# Patient Record
Sex: FEMALE | Race: WHITE
[De-identification: names, ages, dates, MRNs, and addresses within clinical notes are randomized per-mention and may not be internally consistent; named-entity substitution may affect disease eponyms.]

---

## 2019-09-23 ENCOUNTER — HOSPITAL ENCOUNTER (OUTPATIENT)
Dept: HOSPITAL 75 - RAD FS | Age: 84
End: 2019-09-23
Attending: NURSE PRACTITIONER
Payer: MEDICARE

## 2019-09-23 DIAGNOSIS — M17.12: Primary | ICD-10-CM

## 2019-09-23 PROCEDURE — 73562 X-RAY EXAM OF KNEE 3: CPT

## 2019-09-23 NOTE — DIAGNOSTIC IMAGING REPORT
INDICATION: Chronic left knee pain.



TIME OF EXAM: 1:48 p.m.



FINDINGS: Three views of the left knee were obtained. There is

significant lateral compartmental degenerative change with joint

space narrowing and marginal spurring. Milder medial and

patellofemoral compartmental degenerative changes noted. No

fractures are seen. There is no joint effusion.



IMPRESSION: Degenerative changes. No acute bony abnormality is

detected.



Dictated by: 



  Dictated on workstation # [id number]

## 2020-11-25 ENCOUNTER — HOSPITAL ENCOUNTER (OUTPATIENT)
Dept: HOSPITAL 75 - ER FS | Age: 85
Setting detail: OBSERVATION
LOS: 2 days | End: 2020-11-27
Attending: FAMILY MEDICINE | Admitting: INTERNAL MEDICINE
Payer: MEDICARE

## 2020-11-25 VITALS — SYSTOLIC BLOOD PRESSURE: 161 MMHG | DIASTOLIC BLOOD PRESSURE: 84 MMHG

## 2020-11-25 VITALS — SYSTOLIC BLOOD PRESSURE: 138 MMHG | DIASTOLIC BLOOD PRESSURE: 84 MMHG

## 2020-11-25 VITALS — SYSTOLIC BLOOD PRESSURE: 158 MMHG | DIASTOLIC BLOOD PRESSURE: 82 MMHG

## 2020-11-25 VITALS — SYSTOLIC BLOOD PRESSURE: 134 MMHG | DIASTOLIC BLOOD PRESSURE: 71 MMHG

## 2020-11-25 VITALS — HEIGHT: 63.98 IN | BODY MASS INDEX: 24.73 KG/M2 | WEIGHT: 144.84 LBS

## 2020-11-25 DIAGNOSIS — Z88.5: ICD-10-CM

## 2020-11-25 DIAGNOSIS — Z79.82: ICD-10-CM

## 2020-11-25 DIAGNOSIS — F03.90: ICD-10-CM

## 2020-11-25 DIAGNOSIS — R41.82: ICD-10-CM

## 2020-11-25 DIAGNOSIS — R09.02: Primary | ICD-10-CM

## 2020-11-25 DIAGNOSIS — J18.9: ICD-10-CM

## 2020-11-25 DIAGNOSIS — Z20.828: ICD-10-CM

## 2020-11-25 DIAGNOSIS — N39.0: ICD-10-CM

## 2020-11-25 DIAGNOSIS — Z79.899: ICD-10-CM

## 2020-11-25 LAB
ALBUMIN SERPL-MCNC: 4 GM/DL (ref 3.2–4.5)
ALP SERPL-CCNC: 56 U/L (ref 40–136)
ALT SERPL-CCNC: 18 U/L (ref 0–55)
APTT PPP: YELLOW S
BACTERIA #/AREA URNS HPF: (no result) /HPF
BASOPHILS # BLD AUTO: 0.1 10^3/UL (ref 0–0.1)
BASOPHILS NFR BLD AUTO: 1 % (ref 0–10)
BILIRUB SERPL-MCNC: 0.3 MG/DL (ref 0.1–1)
BILIRUB UR QL STRIP: NEGATIVE
BUN/CREAT SERPL: 24
CALCIUM SERPL-MCNC: 9.6 MG/DL (ref 8.5–10.1)
CHLORIDE SERPL-SCNC: 99 MMOL/L (ref 98–107)
CO2 SERPL-SCNC: 25 MMOL/L (ref 21–32)
CREAT SERPL-MCNC: 0.67 MG/DL (ref 0.6–1.3)
EOSINOPHIL # BLD AUTO: 0.3 10^3/UL (ref 0–0.3)
EOSINOPHIL NFR BLD AUTO: 3 % (ref 0–10)
FIBRINOGEN PPP-MCNC: (no result) MG/DL
GFR SERPLBLD BASED ON 1.73 SQ M-ARVRAT: > 60 ML/MIN
GLUCOSE SERPL-MCNC: 93 MG/DL (ref 70–105)
GLUCOSE UR STRIP-MCNC: NEGATIVE MG/DL
HCT VFR BLD CALC: 43 % (ref 35–52)
HGB BLD-MCNC: 14.1 G/DL (ref 11.5–16)
KETONES UR QL STRIP: NEGATIVE
LEUKOCYTE ESTERASE UR QL STRIP: (no result)
LYMPHOCYTES # BLD AUTO: 20.6 X 10^3 (ref 1–4)
LYMPHOCYTES NFR BLD AUTO: 22 % (ref 12–44)
MANUAL DIFFERENTIAL PERFORMED BLD QL: NO
MCH RBC QN AUTO: 35 PG (ref 25–34)
MCHC RBC AUTO-ENTMCNC: 33 G/DL (ref 32–36)
MCV RBC AUTO: 107 FL (ref 80–99)
MONOCYTES # BLD AUTO: 0.9 X 10^3 (ref 0–1)
MONOCYTES NFR BLD AUTO: 10 % (ref 0–12)
NEUTROPHILS # BLD AUTO: 5.9 X 10^3 (ref 1.8–7.8)
NEUTROPHILS NFR BLD AUTO: 63 % (ref 42–75)
NITRITE UR QL STRIP: POSITIVE
PH UR STRIP: 7 [PH] (ref 5–9)
PLATELET # BLD: 316 10^3/UL (ref 130–400)
PMV BLD AUTO: 9 FL (ref 7.4–10.4)
POTASSIUM SERPL-SCNC: 4.6 MMOL/L (ref 3.6–5)
PROT SERPL-MCNC: 7 GM/DL (ref 6.4–8.2)
PROT UR QL STRIP: (no result)
RBC #/AREA URNS HPF: (no result) /HPF
SODIUM SERPL-SCNC: 134 MMOL/L (ref 135–145)
SP GR UR STRIP: 1.02 (ref 1.02–1.02)
SQUAMOUS #/AREA URNS HPF: (no result) /HPF
WBC # BLD AUTO: 9.4 10^3/UL (ref 4.3–11)
WBC #/AREA URNS HPF: (no result) /HPF

## 2020-11-25 PROCEDURE — 81000 URINALYSIS NONAUTO W/SCOPE: CPT

## 2020-11-25 PROCEDURE — 85025 COMPLETE CBC W/AUTO DIFF WBC: CPT

## 2020-11-25 PROCEDURE — 71046 X-RAY EXAM CHEST 2 VIEWS: CPT

## 2020-11-25 PROCEDURE — 97535 SELF CARE MNGMENT TRAINING: CPT

## 2020-11-25 PROCEDURE — 87088 URINE BACTERIA CULTURE: CPT

## 2020-11-25 PROCEDURE — 87077 CULTURE AEROBIC IDENTIFY: CPT

## 2020-11-25 PROCEDURE — 99284 EMERGENCY DEPT VISIT MOD MDM: CPT

## 2020-11-25 PROCEDURE — 36415 COLL VENOUS BLD VENIPUNCTURE: CPT

## 2020-11-25 PROCEDURE — 87186 SC STD MICRODIL/AGAR DIL: CPT

## 2020-11-25 PROCEDURE — 51702 INSERT TEMP BLADDER CATH: CPT

## 2020-11-25 PROCEDURE — 80053 COMPREHEN METABOLIC PANEL: CPT

## 2020-11-25 PROCEDURE — 87040 BLOOD CULTURE FOR BACTERIA: CPT

## 2020-11-25 PROCEDURE — 97162 PT EVAL MOD COMPLEX 30 MIN: CPT

## 2020-11-25 PROCEDURE — 87635 SARS-COV-2 COVID-19 AMP PRB: CPT

## 2020-11-25 PROCEDURE — 80048 BASIC METABOLIC PNL TOTAL CA: CPT

## 2020-11-25 PROCEDURE — 71045 X-RAY EXAM CHEST 1 VIEW: CPT

## 2020-11-25 PROCEDURE — 97110 THERAPEUTIC EXERCISES: CPT

## 2020-11-25 PROCEDURE — 97116 GAIT TRAINING THERAPY: CPT

## 2020-11-25 PROCEDURE — 80184 ASSAY OF PHENOBARBITAL: CPT

## 2020-11-25 PROCEDURE — 97530 THERAPEUTIC ACTIVITIES: CPT

## 2020-11-25 PROCEDURE — 86141 C-REACTIVE PROTEIN HS: CPT

## 2020-11-25 PROCEDURE — 84145 PROCALCITONIN (PCT): CPT

## 2020-11-25 PROCEDURE — 97166 OT EVAL MOD COMPLEX 45 MIN: CPT

## 2020-11-25 RX ADMIN — TRAZODONE HYDROCHLORIDE SCH MG: 100 TABLET ORAL at 20:14

## 2020-11-25 RX ADMIN — ALPRAZOLAM SCH MG: 0.25 TABLET ORAL at 20:15

## 2020-11-25 RX ADMIN — PHENOBARBITAL SCH MG: 64.8 TABLET ORAL at 20:17

## 2020-11-25 RX ADMIN — MELATONIN 3 MG ORAL TABLET PRN MG: 3 TABLET ORAL at 20:14

## 2020-11-25 RX ADMIN — DOCUSATE SODIUM SCH MG: 100 CAPSULE ORAL at 20:14

## 2020-11-25 RX ADMIN — SODIUM CHLORIDE SCH MLS/HR: 900 INJECTION, SOLUTION INTRAVENOUS at 15:57

## 2020-11-25 RX ADMIN — ENOXAPARIN SODIUM SCH MG: 100 INJECTION SUBCUTANEOUS at 15:58

## 2020-11-25 RX ADMIN — SENNOSIDES SCH MG: 8.6 TABLET, FILM COATED ORAL at 20:14

## 2020-11-25 NOTE — OCCUPATIONAL THERAPY EVAL
OT Evaluation-General/PLF


Medical Diagnosis


Admission Date


Nov 25, 2020 at 12:50


Medical Diagnosis:  Pneumonia, hypoxia


Onset Date:  Nov 25, 2020





Therapy Diagnosis


Therapy Diagnosis:  decreased ADL status, weakness





Precautions


Precautions/Isolations:  Contact Isolation, Droplet Isolation, Fall Prevention





Referral


Physician:  Antwan


Referral Reason:  Evaluation/Treatment





Medical History


Additional Medical History


seizures


Current History


EMS for initial call stating concern for stroke, AMS


Reviewed History:  Yes





Social History


Home:  Nursing Home





ADL-Prior Level of Function


SCALE: Activities may be completed with or without assistive devices.





6-Indepedent-patient completes the activity by him/herself with no assistance 

from a helper.


5-Set-up or Clean-up Assistance-helper sets up or cleans up; patient completes 

activity. [place] assists only prior to or  


    following the activity.


4-Supervision or Touching Assistance-helper provides verbal cues and/or 

touching/steadying and/or contact guard assistance as patient completes 

activity. Assistance may be provided   


    throughout the activity or intermittently.


3-Partial/Moderate Assistance-helper does LESS THAN HALF the effort. [place] 

lifts, holds or supports trunk or limbs, but provides less than half the effort.


2-Substantial/Maximal Assistance-helper does MORE THAN HALF the effort. [place] 

lifts or holds trunk or limbs and provides more than half the effort.


5-Ngvlnbfke-grirej does ALL the effort. Patient does none of the effort to 

complete the activity. Or, the assistance of 2 or more helpers is required for 

the patient to complete the  


    activity.


If activity was not attempted, code reason:


7-Patient Refused.


9-Not Applicable-not attempted and the patient did not perform the activity 

before the current illness, exacerbation or injury.


10-Not Attempted due to Environmental Limitations-(lack of equipment, weather 

restraints, etc.).


88-Not Attempted due to Medical Conditions or Safety Concerns.


ADL PLOF Comments


Pt resided at a NH where she primarily performed functional mobility using a 

w/c. Pt indicates she is able to self propel w/c. Pt required assistance with 

ADLS


Self Care:  Needed Some Help


Functional Cognition:  Independent


DME/Equipment Comments


w/c





OT Current Status


Subjective


Pt laying in bed, agreeable to OT evaluation. Pt reports pain but unable to 

rate.





Mental Status/Objective


Patient Orientation:  Person, Confused


Attachments:  Platt Catheter





Current


Glasses/Contacts:  Yes


Dentures/Partials:  Yes


Upper Extremity ROM


RUE shoulder flexion to approx 130 degrees, LUE shoulder flexion to approx 80 

degrees (pt grimaced with movement)


Upper Extremity Sensation


WFL


Upper Extremity Strength


grossly 3/5





ADL-Treatment


Eating (QC):  5 (Based on clincial judgement, pt would require set up assistance

for meals.)


On/Off Footwear (QC):  1 (Based on clincial judgemnet and pain in LEs, pt would 

require total assistance with task.)





Other Treatments


Pt laying in bed, transferred supine to sit EOB. Pt transferred via SPT from bed

to recliner, max A. Once at recliner, pt participated in UE screen. Pt asking 

for her dentures, pt's nurse indicates she has been asking for them constantly 

and nursing staff has told pt they are at nursing home. Post OT tx, pt seated in

recliner, call light in reach and all needs met. Pt educated to use call light 

when she needs to get up.





Education


OT Patient Education:  Correct positioning, Modified ADL techniques, Progress 

toward Goal/Update tx plan, Purpose of tx/functional activities


Teaching Recipient:  Patient


Teaching Methods:  Discussion


Response to Teaching:  Verbalize Understanding





OT Long Term Goals


Long Term Goals


Time Frame:  Dec 4, 2020


Eating (QC):  5


Oral Hygiene (QC):  5


Toileting Hygiene (QC):  3


Shower/Bathe Self (QC):  3


Upper Body Dressing (QC):  4


Lower Body Dressing (QC):  3


On/Off Footwear (QC):  2


Additional Goals:  1-Demonstrate ADL Tasks, 2-Verbalize Understanding, 3-

ImproveStrength/Starla


1=Demonstrate adherence to instructed precautions during ADL tasks.


2=Patient will verbalize/demonstrate understanding of assistive devic

es/modifications for ADL.


3=Patient will improve strength/tolerance for activity to enable patient to 

perform ADL's.





OT Education/Plan


Problem List/Assessment


Assessment:  Decreased Activ Tolerance, Decreased UE Strength, Impaired Bed 

Mobility, Impaired Funct Balance, Impaired I ADL's, Impaired Self-Care Skills, 

Restricted Funct UE ROM





Discharge Recommendations


Plan/Recommendations:  Continue POC


Therapy Discharge Recommendati:  Other, See Comments (Nursing Home)





Treatment Plan/Plan of Care


Patient would benefit from OT for education, treatment and training to promote 

independence in ADL's, mobility, safety and/or upper extremity function for 

ADL's.


Plan of Care:  ADL Retraining, Functional Mobility, UE Funct Exercise/Act


Treatment Duration:  Dec 4, 2020


Frequency:  5 times per week


Agreement:  Yes


Rehab Potential:  Guarded





Time/GCodes


Start Time:  15:30


Stop Time:  15:41


Total Time Billed (hr/min):  11


Billed Treatment Time


1, NAGI FLOWERS OT          Nov 25, 2020 16:13

## 2020-11-25 NOTE — NUR
CALLED [facility] IN [place] TO INQUIRE ABOUT THE POSSIBILITY OF GETTING THE PATIENT'S 
TEETH.  THEY WILL CHECK TO SEE IF THEY CAN FIND SOMEONE WILLING TO BRING THEM HERE.  THEY 
ARE NOT SURE IF IT WILL BE POSSIBLE

## 2020-11-25 NOTE — PHYSICAL THERAPY EVALUATION
PT Evaluation-General


Medical Diagnosis


Admission Date


Nov 25, 2020 at 12:50


Medical Diagnosis:  pneumonia, PUI


Onset Date:  Nov 25, 2020





Therapy Diagnosis


Therapy Diagnosis:  impaired mobility, strength, endurance





Precautions


Precautions/Isolations:  Contact Isolation, Droplet Isolation, Fall Prevention





Referral


Physician:  Antwan


Reason for Referral:  Evaluation/Treatment





Medical History


Additional Medical History


seizures


Reviewed History:  Yes





Social History


Home:  Nursing Home





Prior


Prior Level of Function


SCALE: Activities may be completed with or without assistive devices.





6-Indepedent-patient completes the activity by him/herself with no assistance 

from a helper.


5-Set-up or Clean-up Assistance-helper sets up or cleans up; patient completes 

activity. [place] assists only prior to or  


    following the activity.


4-Supervision or Touching Assistance-helper provides verbal cues and/or 

touching/steadying and/or contact guard assistance as patient completes 

activity. Assistance may be provided   


    throughout the activity or intermittently.


3-Partial/Moderate Assistance-helper does LESS THAN HALF the effort. [place] 

lifts, holds or supports trunk or limbs, but provides less than half the effort.


2-Substantial/Maximal Assistance-helper does MORE THAN HALF the effort. [place] 

lifts or holds trunk or limbs and provides more than half the effort.


8-Kswhyzaxj-gjbfhx does ALL the effort. Patient does none of the effort to 

complete the activity. Or, the assistance of 2 or more helpers is required for 

the patient to complete the  


    activity.


If activity was not attempted, code reason:


7-Patient Refused.


9-Not Applicable-not attempted and the patient did not perform the activity befo

re the current illness, exacerbation or injury.


10-Not Attempted due to Environmental Limitations-(lack of equipment, weather re

straints, etc.).


88-Not Attempted due to Medical Conditions or Safety Concerns.


unknown for most of this but patient states that she spends time in a WC and 

needs a lot of help to get into it.





PT Evaluation-Current


Subjective


Patient in bed pre tx, agrees to PT, has no complaints of pain





Pt/Family Goals


none stated





Objective


Patient Orientation:  Person, Place, Eyes Open





ROM/Strength


Strength Lower Extremities


3/5 gross BLE





Sensory


Hearing:  Functional


Sensation Right Lower Extremit:  Intact


Sensation Left Lower Extremity:  Intact





Transfers


Roll Left to Right (QC):  2


Lying to Sitting/Side of Bed(Q:  2


Sit to Stand (QC):  2


Chair/Bed-to-Chair Xfer(QC):  2


Patient max assist to sit on the side of the bed and for stand pivot transfer 

into recliner.  Patient in recliner post tx with nurse call, phone, tray, all 

needs met.  Nurse notified that patient is in recliner, patient states she will 

call nurse if she needs to get up.





Treatment


bed mobility, transfers





Assessment/Needs


Patient has impaired mobility, strength, endurance.  Max assist for transfers.


Rehab Potential:  Poor





PT Long Term Goals


Long Term Goals


PT Long Term Goals Time Frame:  Dec 2, 2020


Roll Left & Right (QC):  3


Sit to Lying (QC):  3


Lying-Sitting on Side/Bed(QC):  3


Sit to Stand (QC):  3


Chair/Bed-to-Chair Xfer(QC):  3





PT Plan


Problem List


Problem List:  Activity Tolerance, Functional Strength, Safety, Balance, 

Transfer, Bed Mobility, ROM





Treatment/Plan


Treatment Plan:  Continue Plan of Care


Treatment Plan:  Bed Mobility, Education, Functional Activity Starla, Functional 

Strength, Safety, Therapeutic Exercise, Transfers


Treatment Duration:  Dec 2, 2020


Frequency:  6 times per week


Estimated Hrs Per Day:  .25 hour per day


Patient and/or Family Agrees t:  Yes





Safety Risks/Education


Patient Education:  Transfer Techniques, Correct Positioning, Safety Issues


Teaching Recipient:  Patient


Teaching Methods:  Demonstration, Discussion


Response to Teaching:  Reinforcement Needed





Discharge Recommendations


Plan


Patient will perform bed mobility and transfer training, balance and endurance 

training, functional strengthening, and education, to improve functional 

mobility and independence at home.


Therapy Discharge Recommendati:  Other, See Comments (NH)





Time/GCodes


Time In:  1530


Time Out:  1541


Total Billed Treatment Time:  11


Total Billed Treatment


1 visit


HARRIET LOPEZ PT                Nov 25, 2020 16:15

## 2020-11-25 NOTE — DIAGNOSTIC IMAGING REPORT
EXAMINATION: Chest 1 view



HISTORY: hypoxia



COMPARISON: None available.



FINDINGS: 



Heart size and pulmonary vasculature are normal. Patchy

interstitial opacities throughout both lungs. More focal airspace

opacities within the right mid lung and left lower lung. No

pleural effusion or pneumothorax. The osseous structures are

intact.



IMPRESSION: 



1. Patchy interstitial and airspace opacities, concerning for a

multifocal pneumonia.



Dictated by: 



  Dictated on workstation # [id number]

## 2020-11-25 NOTE — ED NEUROLOGICAL PROBLEM
General


Chief Complaint:  Altered Mental Status


Stated Complaint:  AMS


Source:  patient, EMS, RN notes reviewed, nursing home records


Exam Limitations:  no limitations





History of Present Illness


Date Seen by Provider:  Nov 25, 2020


Time Seen by Provider:  09:10


Initial Comments


84-year-old female presents via EMS with initial call stating concern for 

stroke.  Patient past medical history significant for seizure disorders and was 

stated to have lost bladder control and have altered mental status with 

questionable one-sided weakness of her face.  On EMS arrival, patient alert and 

oriented without any complaint or obvious neurologic deficit.  Had had slight 

hypoxia with saturations 88 percent started on oxygen, patient reluctant to 

receive care and refused IV.  On arrival she is without complaint and wants to 

go home.





Allergies and Home Medications


Allergies


Coded Allergies:  


     morphine (Verified  Allergy, Unknown, 11/25/20)





Patient Home Medication List


Home Medication List Reviewed:  Yes





Review of Systems


Review of Systems


Constitutional:  no symptoms reported, see HPI; No chills, No fever, No malaise,

No weakness


Respiratory:  No cough, No short of breath


Cardiovascular:  No chest pain, No syncope


Gastrointestinal:  No abdominal pain, No diarrhea, No vomiting


Musculoskeletal:  No back pain, No joint pain


Psychiatric/Neurological:  See HPI; Denies Headache, Denies Numbness, Denies 

Tingling, Denies Unable to Move Lower Ext, Denies Unable to Move Upper Ext, 

Denies Weakness





Past Medical-Social-Family Hx


Past Med/Social Hx:  Reviewed Nursing Past Med/Soc Hx





Physical Exam


Vital Signs





Vital Signs - First Documented








 11/25/20





 09:15


 


Temp 36.4


 


Pulse 70


 


Resp 20


 


B/P (MAP) 147/84 (105)


 


Pulse Ox 90


 


O2 Delivery Room Air





Capillary Refill :


Height, Weight, BMI


Height: '"


Weight: lbs. oz. kg;  BMI


Method:


General Appearance:  WD/WN, no apparent distress


HEENT:  PERRL/EOMI, normal ENT inspection


Neck:  non-tender, full range of motion, supple


Respiratory:  chest non-tender, lungs clear


Cardiovascular:  regular rate, rhythm, no JVD


Gastrointestinal:  non tender, soft


Back:  normal inspection, no CVA tenderness


Extremities:  normal range of motion, non-tender, normal capillary refill


Neurologic/Psychiatric:  CNs II-XII nml as tested, no motor/sensory deficits, 

alert, normal mood/affect


Crainal Nerves:  normal speech; No abnormal speech, No facial asymmetry, No 

facial droop, No facial paresthesias, No facial weakness


Coordination/Gait:  normal finger to nose


Motor/Sensory:  no motor deficit, no sensory deficit, no pronator drift


Skin:  normal color, warm/dry





Progress/Results/Core Measures


Results/Orders


Lab Results





Laboratory Tests








Test


 11/25/20


09:45 11/25/20


09:49 11/25/20


10:04 11/25/20


10:30 Range/Units


 


 


White Blood Count


 9.4 


 


 


 


 4.3-11.0


10^3/uL


 


Red Blood Count


 4.00 L


 


 


 


 4.35-5.85


10^6/uL


 


Hemoglobin 14.1     11.5-16.0  G/DL


 


Hematocrit 43     35-52  %


 


Mean Corpuscular Volume 107 H    80-99  FL


 


Mean Corpuscular Hemoglobin 35 H    25-34  PG


 


Mean Corpuscular Hemoglobin


Concent 33 


 


 


 


 32-36  G/DL





 


Red Cell Distribution Width 12.9     10.0-14.5  %


 


Platelet Count


 316 


 


 


 


 130-400


10^3/uL


 


Mean Platelet Volume 9.0     7.4-10.4  FL


 


Immature Granulocyte % (Auto) 1      %


 


Neutrophils (%) (Auto) 63     42-75  %


 


Lymphocytes (%) (Auto) 22     12-44  %


 


Monocytes (%) (Auto) 10     0-12  %


 


Eosinophils (%) (Auto) 3     0-10  %


 


Basophils (%) (Auto) 1     0-10  %


 


Neutrophils # (Auto) 5.9     1.8-7.8  X 10^3


 


Lymphocytes # (Auto) 20.6 H    1.0-4.0  X 10^3


 


Monocytes # (Auto) 0.9     0.0-1.0  X 10^3


 


Eosinophils # (Auto)


 0.3 


 


 


 


 0.0-0.3


10^3/uL


 


Basophils # (Auto)


 0.1 


 


 


 


 0.0-0.1


10^3/uL


 


Immature Granulocyte # (Auto)


 0.1 


 


 


 


 0.0-0.1


10^3/uL


 


Sodium Level 134 L    135-145  MMOL/L


 


Potassium Level 4.6     3.6-5.0  MMOL/L


 


Chloride Level 99       MMOL/L


 


Carbon Dioxide Level 25     21-32  MMOL/L


 


Anion Gap 10     5-14  MMOL/L


 


Blood Urea Nitrogen 16     7-18  MG/DL


 


Creatinine


 0.67 


 


 


 


 0.60-1.30


MG/DL


 


Estimat Glomerular Filtration


Rate > 60 


 


 


 


  





 


BUN/Creatinine Ratio 24      


 


Glucose Level 93       MG/DL


 


Calcium Level 9.6     8.5-10.1  MG/DL


 


Corrected Calcium 9.6     8.5-10.1  MG/DL


 


Total Bilirubin 0.3     0.1-1.0  MG/DL


 


Aspartate Amino Transf


(AST/SGOT) 20 


 


 


 


 5-34  U/L





 


Alanine Aminotransferase


(ALT/SGPT) 18 


 


 


 


 0-55  U/L





 


Alkaline Phosphatase 56       U/L


 


Total Protein 7.0     6.4-8.2  GM/DL


 


Albumin 4.0     3.2-4.5  GM/DL


 


C-Reactive Protein  0.35    <0.50  MG/DL


 


Urine Color   YELLOW    


 


Urine Clarity   SLT CLOUDY    


 


Urine pH   7.0   5-9  


 


Urine Specific Gravity   1.020   1.016-1.022  


 


Urine Protein   TRACE H  NEGATIVE  


 


Urine Glucose (UA)   NEGATIVE   NEGATIVE  


 


Urine Ketones   NEGATIVE   NEGATIVE  


 


Urine Nitrite   POSITIVE H  NEGATIVE  


 


Urine Bilirubin   NEGATIVE   NEGATIVE  


 


Urine Urobilinogen   1.0   < = 1.0  MG/DL


 


Urine Leukocyte Esterase   1+ H  NEGATIVE  


 


Urine RBC (Auto)   NEGATIVE   NEGATIVE  


 


Urine RBC   NONE    /HPF


 


Urine WBC    H   /HPF


 


Urine Squamous Epithelial


Cells 


 


 0-2 


 


  /HPF





 


Urine Crystals   NONE    /LPF


 


Urine Bacteria   LARGE H   /HPF


 


Urine Casts   NONE    /LPF


 


Urine Mucus   NEGATIVE    /LPF


 


Urine Culture Indicated   YES    








My Orders





Orders - GAYLE CHO DO


Ed Iv/Invasive Line Start (11/25/20 09:15)


Cbc With Automated Diff (11/25/20 09:15)


Comprehensive Metabolic Panel (11/25/20 09:15)


Urinalysis (11/25/20 09:15)


Chest 1 View Ap/Pa Only (11/25/20 09:15)


Phenobarbital (11/25/20 10:13)


Crp Fs (11/25/20 10:17)


Procalcitonin (Pct) (11/25/20 10:17)


Coronavirus Sars-Cov-2 So 2019 (11/25/20 10:17)


Blood Culture (11/25/20 10:18)


Dexamethasone Injection (Decadron Inje (11/25/20 10:30)


Ceftriaxone  For Iv Use (Rocephin  For I (11/25/20 10:30)


Azithromycin Injection (Zithromax Inject (11/25/20 10:30)


Urine Culture (11/25/20 10:04)


Blood Culture (11/25/20 10:45)





Medications Given in ED





Current Medications








 Medications  Dose


 Ordered  Sig/Alen


 Route  Start Time


 Stop Time Status Last Admin


Dose Admin


 


 Azithromycin 500


 mg/Sodium Chloride  250 ml @ 


 250 mls/hr  ONCE  ONCE


 IV  11/25/20 10:30


 11/25/20 11:29 DC 11/25/20 11:09


250 MLS/HR


 


 Ceftriaxone


 Sodium 1000 mg/


 Sterile Water  10 ml @ 


 200 mls/hr  ONCE  ONCE


 IV  11/25/20 10:30


 11/25/20 10:33 DC 11/25/20 11:02


200 MLS/HR


 


 Dexamethasone


 Sodium Phosphate  6 mg  ONCE  ONCE


 IV  11/25/20 10:30


 11/25/20 10:31 DC 11/25/20 11:02


6 MG








Vital Signs/I&O











 11/25/20





 09:15


 


Temp 36.4


 


Pulse 70


 


Resp 20


 


B/P (MAP) 147/84 (105)


 


Pulse Ox 90


 


O2 Delivery Room Air











Progress


Progress Note :  


Progress Note


Discussed history of present illness, significant past medical history as well 

as clinical findings, labs and chest x-ray results with Dr. Moncada.  Not 

obviously COVID-19, however patient still PUI with COVID results pending.  Treat

for possible pneumonia and await pro calcitonin as well as CRP levels to 

determine additional care.





Diagnostic Imaging





   Diagonstic Imaging:  Xray


Comments


FINDINGS: 





Heart size and pulmonary vasculature are normal. Patchy


interstitial opacities throughout both lungs. More focal airspace


opacities within the right mid lung and left lower lung. No


pleural effusion or pneumothorax. The osseous structures are


intact.





IMPRESSION: 





1. Patchy interstitial and airspace opacities, concerning for a


multifocal pneumonia.





  Dictated on workstation # [id number]








Dict:   11/25/20 [id number]


Trans:   11/25/20 1004


[id number] [phone]





Interpreted by:     LIVE HALE DO


Electronically signed by:





Departure


Communication (Admissions)


Time/Spoke to Admitting Phy:  10:30


Spoke to Dr Moncada @ 1030 who accepts for OBS admission





Impression





   Primary Impression:  


   Pneumonia


   Qualified Codes:  J18.9 - Pneumonia, unspecified organism


   Additional Impression:  


   Hypoxia


Disposition:  01 HOME, SELF-CARE


Condition:  Stable





Admissions


Decision to Admit Reason:  Admit from ER (General)


Decision to Admit/Date:  Nov 25, 2020


Time/Decision to Admit Time:  10:30





Departure-Patient Inst.


Referrals:  


DORIS OWENS MD (PCP/Family)


Primary Care Physician











GAYLE CHO DO         Nov 25, 2020 09:38

## 2020-11-25 NOTE — NUR
Patient's son called and informed patient is to be admitted. Son, Anton Arora, 
states he lives in [place] and would prefer that patient be admitted there.

## 2020-11-26 VITALS — DIASTOLIC BLOOD PRESSURE: 87 MMHG | SYSTOLIC BLOOD PRESSURE: 153 MMHG

## 2020-11-26 VITALS — DIASTOLIC BLOOD PRESSURE: 74 MMHG | SYSTOLIC BLOOD PRESSURE: 150 MMHG

## 2020-11-26 VITALS — SYSTOLIC BLOOD PRESSURE: 140 MMHG | DIASTOLIC BLOOD PRESSURE: 66 MMHG

## 2020-11-26 VITALS — DIASTOLIC BLOOD PRESSURE: 61 MMHG | SYSTOLIC BLOOD PRESSURE: 138 MMHG

## 2020-11-26 VITALS — SYSTOLIC BLOOD PRESSURE: 148 MMHG | DIASTOLIC BLOOD PRESSURE: 74 MMHG

## 2020-11-26 LAB
BASOPHILS # BLD AUTO: 0 10^3/UL (ref 0–0.1)
BASOPHILS NFR BLD AUTO: 0 % (ref 0–10)
BUN/CREAT SERPL: 21
CALCIUM SERPL-MCNC: 8.6 MG/DL (ref 8.5–10.1)
CHLORIDE SERPL-SCNC: 101 MMOL/L (ref 98–107)
CO2 SERPL-SCNC: 23 MMOL/L (ref 21–32)
CREAT SERPL-MCNC: 0.78 MG/DL (ref 0.6–1.3)
EOSINOPHIL # BLD AUTO: 0 10^3/UL (ref 0–0.3)
EOSINOPHIL NFR BLD AUTO: 0 % (ref 0–10)
GFR SERPLBLD BASED ON 1.73 SQ M-ARVRAT: > 60 ML/MIN
GLUCOSE SERPL-MCNC: 96 MG/DL (ref 70–105)
HCT VFR BLD CALC: 37 % (ref 35–52)
HGB BLD-MCNC: 12.3 G/DL (ref 11.5–16)
LYMPHOCYTES # BLD AUTO: 1.4 10^3/UL (ref 1–4)
LYMPHOCYTES NFR BLD AUTO: 20 % (ref 12–44)
MANUAL DIFFERENTIAL PERFORMED BLD QL: NO
MCH RBC QN AUTO: 35 PG (ref 25–34)
MCHC RBC AUTO-ENTMCNC: 33 G/DL (ref 32–36)
MCV RBC AUTO: 106 FL (ref 80–99)
MONOCYTES # BLD AUTO: 0.7 10^3/UL (ref 0–1)
MONOCYTES NFR BLD AUTO: 9 % (ref 0–12)
NEUTROPHILS # BLD AUTO: 4.9 10^3/UL (ref 1.8–7.8)
NEUTROPHILS NFR BLD AUTO: 69 % (ref 42–75)
PLATELET # BLD: 303 10^3/UL (ref 130–400)
PMV BLD AUTO: 9.9 FL (ref 9–12.2)
POTASSIUM SERPL-SCNC: 4.2 MMOL/L (ref 3.6–5)
SODIUM SERPL-SCNC: 135 MMOL/L (ref 135–145)
WBC # BLD AUTO: 7.1 10^3/UL (ref 4.3–11)

## 2020-11-26 RX ADMIN — SODIUM CHLORIDE SCH MLS/HR: 900 INJECTION, SOLUTION INTRAVENOUS at 15:15

## 2020-11-26 RX ADMIN — SENNOSIDES SCH MG: 8.6 TABLET, FILM COATED ORAL at 08:30

## 2020-11-26 RX ADMIN — PHENOBARBITAL SCH MG: 64.8 TABLET ORAL at 21:35

## 2020-11-26 RX ADMIN — LEVOTHYROXINE SODIUM SCH MCG: 25 TABLET ORAL at 06:50

## 2020-11-26 RX ADMIN — MELATONIN 3 MG ORAL TABLET PRN MG: 3 TABLET ORAL at 21:35

## 2020-11-26 RX ADMIN — ENOXAPARIN SODIUM SCH MG: 100 INJECTION SUBCUTANEOUS at 15:08

## 2020-11-26 RX ADMIN — ALPRAZOLAM SCH MG: 0.25 TABLET ORAL at 21:34

## 2020-11-26 RX ADMIN — DOCUSATE SODIUM SCH MG: 100 CAPSULE ORAL at 08:30

## 2020-11-26 RX ADMIN — DOCUSATE SODIUM SCH MG: 100 CAPSULE ORAL at 21:34

## 2020-11-26 RX ADMIN — SODIUM CHLORIDE SCH MLS/HR: 900 INJECTION, SOLUTION INTRAVENOUS at 11:30

## 2020-11-26 RX ADMIN — FLUOXETINE SCH MG: 20 CAPSULE ORAL at 08:30

## 2020-11-26 RX ADMIN — SODIUM CHLORIDE SCH MLS/HR: 900 INJECTION INTRAVENOUS at 09:39

## 2020-11-26 RX ADMIN — ASPIRIN SCH MG: 325 TABLET, COATED ORAL at 08:30

## 2020-11-26 RX ADMIN — SODIUM CHLORIDE SCH MLS/HR: 900 INJECTION, SOLUTION INTRAVENOUS at 04:51

## 2020-11-26 RX ADMIN — TRAZODONE HYDROCHLORIDE SCH MG: 100 TABLET ORAL at 21:34

## 2020-11-26 RX ADMIN — SENNOSIDES SCH MG: 8.6 TABLET, FILM COATED ORAL at 21:34

## 2020-11-26 NOTE — HISTORY & PHYSICAL
History of Present Illness


History of Present Illness


Reason for visit/HPI


This is an 85-year-old female who is a resident of a NH in [facility] who was 

brought to the ER via EMS with initial concern for stroke.  Her past medical 

history is significant for seizure disorders and it was stated she had lost 

bladder control and was noted to have altered mental status with weakness of one

side of her face.  On EMS arrival, the patient was alert and oriented without 

any complaint or obvious neurologic deficit.  She was found to have slight 

hypoxia with saturations 88 percent and was started on oxygen.  She was also 

found to have a UTI and patchy infiltrates on CXR.  She will be admitted for 

further treatment.


Date of Admission


2020 at 12:50


Date Seen by a Provider:  2020


Time Seen by a Provider:  12:29


I consulted on this patient on


20


 12:25


Attending Physician


Natacha Moncada MD


Admitting Physician


Anthony Koenig MD


Consult








Allergies and Home Medications


Allergies


Coded Allergies:  


     morphine (Verified  Allergy, Unknown, 20)





Home Medications


Acetaminophen 325 Mg Capsule, 650 MG PO Q6H PRN for PAIN-MILD (1-4) OR 

TEMPATURE, (Reported)


Alendronate Sodium 70 Mg Tablet, 70 MG PO WED, (Reported)


Alprazolam 0.25 Mg Tablet, 0.25 MG PO HS, (Reported)


Aspirin 325 Mg Tablet.dr, 325 MG PO DAILY, (Reported)


Calcium Carbonate 200 Mg Tab.chew, 200 MG PO Q2H PRN for INDIGESTION, (Reported)


Calcium Carbonate/Vitamin D3 1 Each Tablet, 1 EACH PO DAILY, (Reported)


Cholecalciferol (Vitamin D3) 25 Mcg Capsule, 25 MCG PO DAILY, (Reported)


Docusate Sodium 100 Mg Capsule, 100 MG PO BID, (Reported)


Ferrous Sulfate 325 Mg Tablet, 325 MG PO BID, (Reported)


Fluoxetine HCl 20 Mg Capsule, 20 MG PO DAILY, (Reported)


Guaifenesin 100 Mg/5 Ml Liquid, 10 ML PO Q4H PRN for COUGH, (Reported)


Hydrocodone/Acetaminophen 1 Each Tablet, 1 TAB PO Q4H PRN for PAIN-MODERATE, 

(Reported)


Levothyroxine Sodium 25 Mcg Tablet, 25 MCG PO DAILY, (Reported)


Magnesium Hydroxide 400 Mg/5 Ml Oral.susp, 30 ML PO DAILY PRN for CONSTIPATION-

7TH LINE, (Reported)


Megestrol Acetate 400 Mg/10 Ml Oral.susp, 10 ML PO DAILY, (Reported)


Meloxicam 15 Mg Tablet, 15 MG PO 1200, (Reported)


Multivitamin 1 Each Tablet, 1 EACH PO DAILY, (Reported)


Phenobarbital 64.8 Mg Tablet, 97.2 MG PO HS, (Reported)


   TAKES 1 &  (64.8MG) TAB 


Polyethylene Glycol 3350 17 Gm Powd.pack, 17 GM PO DAILY, (Reported)


Trazodone HCl 100 Mg Tablet, 100 MG PO HS, (Reported)





Patient Home Medication List


Home Medication List Reviewed:  Yes





Past Medical-Social-Family Hx


Past Med/Social Hx:  Reviewed Nursing Past Med/Soc Hx


Patient Social History


Alcohol Use:  Denies Use


Recreational Drug Use:  No


Smoking Status:  Never a Smoker


2nd Hand Smoke Exposure:  No


Recent Foreign Travel:  No


Contact w/other who traveled:  No


Recent Hopitalizations:  No


Recent Infectious Disease Expo:  No





Immunizations Up To Date


Date of Pneumonia Vaccine:  Feb 10, 2017


Date of Influenza Vaccine:  Oct 24, 2019





Seasonal Allergies


Seasonal Allergies:  No





Past Medical History


Surgeries:  Orthopedic


Cardiac:  Hypertension


Neurological:  Dementia, Seizure Disorder


Gastrointestinal:  Gastroesophageal Reflux, Chronic Constipation


Psychosocial:  Depression


History of Blood Disorders:  Yes (hemolytic anemia)





Review of Systems


Constitutional:  weakness


EENTM:  No see HPI, No no symptoms reported, No ear discharge, No hearing loss, 

No ear pain, No blurred vision, No double vision, No eye pain, No tearing, No 

vision loss, No dental problems, No hoarseness, No mouth pain, No mouth 

swelling, No epistaxis, No nose congestion, No nose pain, No throat pain, No 

throat swelling, No other


Respiratory:  No no symptoms reported, No see HPI, No cough, No dyspnea on 

exertion, No hemoptysis, No orthopnea, No phlegm, No short of breath, No 

stridor, No wheezing, No other


Cardiovascular:  No no symptoms reported, No see HPI, No chest pain, No edema, 

No Hx of Intervention, No palpitations, No syncope, No vascular heart diseas, No

other


Gastrointestinal:  No RUQ, No LUQ, No RLQ, No LLQ, No no symptoms reported, No 

see HPI, No abdominal pain, No constipation, No diarrhea, No dysphagia, No 

hematemesis, No heartburn, No jaundice, No loss of appetite, No melena, No 

nausea, No vomiting, No other


Genitourinary:  No no symptoms reported, No see HPI, No decreased output, No 

discharge, No dysuria, No frequency, No hematuria, No hesitancy, No incontinenc

e, No nocturia, No pain, No other


Musculoskeletal:  muscle weakness


Skin:  No no symptoms reported, No see HPI, No change in color, No change in 

hair/nails, No dryness, No hx of skin cancer, No lesions, No lumps, No pruritus,

No rash, No other


Psychiatric/Neurological:  Seizure





Physical Exam


Vital Signs





Vital Signs - First Documented








 20





 09:15 11:57


 


Temp 36.4 


 


Pulse 70 


 


Resp 20 


 


B/P (MAP) 147/84 (105) 


 


Pulse Ox 90 


 


O2 Delivery Room Air 


 


O2 Flow Rate  2.00





Capillary Refill : Less Than 3 Seconds


Height, Weight, BMI


Height: '"


Weight: lbs. oz. kg; 24.88 BMI


Method:


General Appearance:  No Apparent Distress


Neck:  Supple


Respiratory:  Lungs Clear


Cardiovascular:  Regular Rate, Rhythm


Gastrointestinal:  Normal Bowel Sounds, Non Tender, Soft


Rectal:  Deferred


Back:  No CVA Tenderness


Extremity:  Non Tender, No Calf Tenderness, No Pedal Edema


Neurologic/Psychiatric:  Alert, Disoriented


Skin:  Warm/Dry


Comments


Laboratory Tests


20 13:02: Coronavirus 2019 ([facility]) Negative


20 04:30: 


Sodium Level 135, Potassium Level 4.2, Chloride Level 101, Carbon Dioxide Level 

23, Anion Gap 11, Blood Urea Nitrogen 16, Creatinine 0.78, Estimat Glomerular 

Filtration Rate > 60, BUN/Creatinine Ratio 21, Glucose Level 96, Calcium Level 

8.6


20 04:40: 


White Blood Count 7.1, Red Blood Count 3.53L, Hemoglobin 12.3, Hematocrit 37, 

Mean Corpuscular Volume 106H, Mean Corpuscular Hemoglobin 35H, Mean Corpuscular 

Hemoglobin Concent 33, Red Cell Distribution Width 12.8, Platelet Count 303, 

Mean Platelet Volume 9.9, Immature Granulocyte % (Auto) 1, Neutrophils (%) 

(Auto) 69, Lymphocytes (%) (Auto) 20, Monocytes (%) (Auto) 9, Eosinophils (%) 

(Auto) 0, Basophils (%) (Auto) 0, Neutrophils # (Auto) 4.9, Lymphocytes # (Auto)

1.4, Monocytes # (Auto) 0.7, Eosinophils # (Auto) 0.0, Basophils # (Auto) 0.0, 

Immature Granulocyte # (Auto) 0.1





Microbiology


20 Urine Culture - Preliminary, Resulted


           Escherichia coli





Assessment/Plan


Assessment and Plan


1.  Acute Hypoxia--started on oxygen in ER, COVID negative x2 so out of 

isolation


2.  Acute UTI--on Rocephin, awaiting urine culture


3.  Acute Pneumonia--afebrile and normal WBC count so will repeat CXR in AM, on 

lovenox for DVT prophylaxis


4.  History of Seizure Disorder--home meds restarted,  patient very well could 

have had a seizure looking at history of presentation


5.  Dementia--home meds restarted





Admission Diagnosis


Admission Status:  Observation





Clinical Quality Measures


DVT/VTE Risk/Contraindication:


Risk Factor Score Per Nursin


RFS Level Per Nursing on Admit:  2=Moderate











OMAYRA COTTER DO        2020 12:30

## 2020-11-26 NOTE — PHYSICAL THERAPY DAILY NOTE
PT Daily Note-Current


Subjective


Patient is in bed and agrees to PT.





Mental Status


Patient Orientation:  Confused


Attachments:  IV





Transfers


SCALE: Activities may be completed with or without assistive devices.





6-Indepedent-patient completes the activity by him/herself with no assistance 

from a helper.


5-Set-up or Clean-up Assistance-helper sets up or cleans up; patient completes 

activity. [place] assists only prior to or  


    following the activity.


4-Supervision or Touching Assistance-helper provides verbal cues and/or 

touching/steadying and/or contact guard assistance as patient completes 

activity. Assistance may be provided   


    throughout the activity or intermittently.


3-Partial/Moderate Assistance-helper does LESS THAN HALF the effort. [place] 

lifts, holds or supports trunk or limbs, but provides less than half the effort.


2-Substantial/Maximal Assistance-helper does MORE THAN HALF the effort. [place] 

lifts or holds trunk or limbs and provides more than half the effort.


2-Lavfwtcsy-ghxwcd does ALL the effort. Patient does none of the effort to 

complete the activity. Or, the assistance of 2 or more helpers is required for 

the patient to complete the  


    activity.


If activity was not attempted, code reason:


7-Patient Refused.


9-Not Applicable-not attempted and the patient did not perform the activity 

before the current illness, exacerbation or injury.


10-Not Attempted due to Environmental Limitations-(lack of equipment, weather 

restraints, etc.).


88-Not Attempted due to Medical Conditions or Safety Concerns.


Lying to Sitting/Side of Bed(Q:  5


Sit to Stand (QC):  2


Chair/Bed-to-Chair Xfer(QC):  2





Gait Training


Distance:  5 steps


Gait Assistive Device:  None


assist of PT with patient retropulsive





Exercises


Seated Therapy Exercises:  Ankle pumps, Long arc quads


Seated Reps:  12





Assessment


Patient is up in recliner with needs met.  Plan return to NH this week per RN.





PT Long Term Goals


Long Term Goals


PT Long Term Goals Time Frame:  Dec 2, 2020


Roll Left & Right (QC):  3


Sit to Lying (QC):  3


Lying-Sitting on Side/Bed(QC):  3


Sit to Stand (QC):  3


Chair/Bed-to-Chair Xfer(QC):  3





PT Plan


Treatment/Plan


Treatment Plan:  Continue Plan of Care


Treatment Plan:  Bed Mobility, Education, Functional Activity Starla, Functional 

Strength, Safety, Therapeutic Exercise, Transfers


Treatment Duration:  Dec 2, 2020


Frequency:  6 times per week


Estimated Hrs Per Day:  .25 hour per day


Patient and/or Family Agrees t:  Yes





Time/GCodes


Time In:  901


Time Out:  914


Total Billed Treatment Time:  13


Total Billed Treatment


1 visit


FA 13 min











CHEO PAYTON PT              Nov 26, 2020 09:35

## 2020-11-26 NOTE — OCCUPATIONAL THER DAILY NOTE
OT Current Status-Daily Note


Subjective


 Pt in recliner upon entry. Pt denies pain, agrees to sponge bath. Pt repeats 

self minimally through session. Cues for safety.





Mental Status/Objective


Attachments:  IV





ADL-Treatment


Therapy Code Descriptions/Definitions 





Functional [place] Measure:


0=Not Assessed/NA        4=Minimal Assistance


1=Total Assistance        5=Supervision or Setup


2=Maximal Assistance  6=Modified [place]


3=Moderate Assistance 7=Complete IndependenceSCALE: Activities may be completed 

with or without assistive devices.





6-Indepedent-patient completes the activity by him/herself with no assistance 

from a helper.


5-Set-up or Clean-up Assistance-helper sets up or cleans up; patient completes 

activity. [place] assists only prior to or  


    following the activity.


4-Supervision or Touching Assistance-helper provides verbal cues and/or 

touching/steadying and/or contact guard assistance as patient completes 

activity. Assistance may be provided   


    throughout the activity or intermittently.


3-Partial/Moderate Assistance-helper does LESS THAN HALF the effort. [place] 

lifts, holds or supports trunk or limbs, but provides less than half the effort.


2-Substantial/Maximal Assistance-helper does MORE THAN HALF the effort. [place] 

lifts or holds trunk or limbs and provides more than half the effort.


9-Bkhisztwo-vzcnav does ALL the effort. Patient does none of the effort to 

complete the activity. Or, the assistance of 2 or more helpers is required for 

the patient to complete the  


    activity.


If activity was not attempted, code reason:


7-Patient Refused.


9-Not Applicable-not attempted and the patient did not perform the activity 

before the current illness, exacerbation or injury.


10-Not Attempted due to Environmental Limitations-(lack of equipment, weather 

restraints, etc.).


88-Not Attempted due to Medical Conditions or Safety Concerns.


Eating (QC):  6


Bathing Location:  L Arm, R Arm, L Upper Leg, R Upper Leg, L Lower Leg (incl

uding foot), R Lower Leg (including foot), Chest, Abdomen, Perineal Area


Shower/Bathe Self (QC):  3 (min A due to decreased strength/ balance in stance. 

Pt able to reach all other ivon.)


Upper Body Dressing (QC):  5 (s/u gown doff/donning.)


Lower Body Dressing (QC):  3 (Pt able to thread over BLE, requires min A due to 

assist in stance pulling over hips.)


On/Off Footwear:  3 (min A)


Toileting Hygiene (QC):  2 (max A bottom hygiene, s/u jm care.)


Toilet Transfer (QC):  7





Other Treatment


Pt completes all ADLs as outlined. Sit to stand with CGA-min A and min A in 

stance to right at times due to decreased strength/ balance. Pt in chair upon 

exit with LEs elevated, all needs met, call light in reach.





Education


OT Patient Education:  Correct positioning, Progress toward Goal/Update tx plan,

Purpose of tx/functional activities, Safety issues


Teaching Recipient:  Patient


Teaching Methods:  Demonstration, Discussion


Response to Teaching:  Verbalize Understanding, Return Demonstration





OT Long Term Goals


Long Term Goals


Time Frame:  Dec 4, 2020


Eating (QC):  5


Oral Hygiene (QC):  5


Toileting Hygiene (QC):  3


Shower/Bathe Self (QC):  3


Upper Body Dressing (QC):  4


Lower Body Dressing (QC):  3


On/Off Footwear (QC):  2


Additional Goals:  1-Demonstrate ADL Tasks, 2-Verbalize Understanding, 3-

ImproveStrength/Starla


1=Demonstrate adherence to instructed precautions during ADL tasks.


2=Patient will verbalize/demonstrate understanding of assistive devices/m

odifications for ADL.


3=Patient will improve strength/tolerance for activity to enable patient to 

perform ADL's.





OT Education/Plan


Problem List/Assessment


Assessment:  Decreased Activ Tolerance, Decreased Safety Aware, Decreased UE 

Strength, Dependent Transfers, Impaired Funct Balance, Impaired I ADL's, 

Impaired Self-Care Skills





Discharge Recommendations


Plan/Recommendations:  Continue POC


Therapy Discharge Recommendati:  24 Hour Supervision, Post Acute OT





Treatment Plan/Plan of Care


Treatment,Training & Education:  Yes


Patient would benefit from OT for education, treatment and training to promote 

independence in ADL's, mobility, safety and/or upper extremity function for 

ADL's.


Plan of Care:  ADL Retraining, Functional Mobility, UE Funct Exercise/Act


Treatment Duration:  Dec 4, 2020


Frequency:  5 times per week


Agreement:  Yes


Rehab Potential:  Guarded





Time/GCodes


Start Time:  10:48


Stop Time:  11:05


Total Time Billed (hr/min):  17


Billed Treatment Time


1, ADL (17)











XIAO CAMP OTR                Nov 26, 2020 11:29

## 2020-11-26 NOTE — NUR
CALLED DOM ([phone])AT [facility] IN [place] TO LET HER KNOW ANTHONY WILL NOT BE 
DISCHARGING TODAY.  SHE WILL HAVE A REPEAT CHEST X-RAY TOMORROW MORNING AND MAY DISCHARGE 
HOME TOMORROW IF IT SHOWS IMPROVEMENT

## 2020-11-27 VITALS — DIASTOLIC BLOOD PRESSURE: 74 MMHG | SYSTOLIC BLOOD PRESSURE: 135 MMHG

## 2020-11-27 VITALS — SYSTOLIC BLOOD PRESSURE: 135 MMHG | DIASTOLIC BLOOD PRESSURE: 74 MMHG

## 2020-11-27 VITALS — SYSTOLIC BLOOD PRESSURE: 162 MMHG | DIASTOLIC BLOOD PRESSURE: 73 MMHG

## 2020-11-27 VITALS — SYSTOLIC BLOOD PRESSURE: 151 MMHG | DIASTOLIC BLOOD PRESSURE: 77 MMHG

## 2020-11-27 LAB
BASOPHILS # BLD AUTO: 0.1 10^3/UL (ref 0–0.1)
BASOPHILS NFR BLD AUTO: 1 % (ref 0–10)
BUN/CREAT SERPL: 12
CALCIUM SERPL-MCNC: 8.9 MG/DL (ref 8.5–10.1)
CHLORIDE SERPL-SCNC: 103 MMOL/L (ref 98–107)
CO2 SERPL-SCNC: 23 MMOL/L (ref 21–32)
CREAT SERPL-MCNC: 0.74 MG/DL (ref 0.6–1.3)
EOSINOPHIL # BLD AUTO: 0.2 10^3/UL (ref 0–0.3)
EOSINOPHIL NFR BLD AUTO: 3 % (ref 0–10)
GFR SERPLBLD BASED ON 1.73 SQ M-ARVRAT: > 60 ML/MIN
GLUCOSE SERPL-MCNC: 91 MG/DL (ref 70–105)
HCT VFR BLD CALC: 40 % (ref 35–52)
HGB BLD-MCNC: 13.1 G/DL (ref 11.5–16)
LYMPHOCYTES # BLD AUTO: 2.4 10^3/UL (ref 1–4)
LYMPHOCYTES NFR BLD AUTO: 27 % (ref 12–44)
MANUAL DIFFERENTIAL PERFORMED BLD QL: NO
MCH RBC QN AUTO: 35 PG (ref 25–34)
MCHC RBC AUTO-ENTMCNC: 32 G/DL (ref 32–36)
MCV RBC AUTO: 107 FL (ref 80–99)
MONOCYTES # BLD AUTO: 1 10^3/UL (ref 0–1)
MONOCYTES NFR BLD AUTO: 11 % (ref 0–12)
NEUTROPHILS # BLD AUTO: 5.2 10^3/UL (ref 1.8–7.8)
NEUTROPHILS NFR BLD AUTO: 58 % (ref 42–75)
PLATELET # BLD: 304 10^3/UL (ref 130–400)
PMV BLD AUTO: 9 FL (ref 9–12.2)
POTASSIUM SERPL-SCNC: 4.2 MMOL/L (ref 3.6–5)
SODIUM SERPL-SCNC: 137 MMOL/L (ref 135–145)
WBC # BLD AUTO: 9 10^3/UL (ref 4.3–11)

## 2020-11-27 RX ADMIN — LEVOTHYROXINE SODIUM SCH MCG: 25 TABLET ORAL at 06:41

## 2020-11-27 RX ADMIN — FLUOXETINE SCH MG: 20 CAPSULE ORAL at 08:38

## 2020-11-27 RX ADMIN — ASPIRIN SCH MG: 325 TABLET, COATED ORAL at 08:38

## 2020-11-27 RX ADMIN — SENNOSIDES SCH MG: 8.6 TABLET, FILM COATED ORAL at 08:37

## 2020-11-27 RX ADMIN — DOCUSATE SODIUM SCH MG: 100 CAPSULE ORAL at 08:38

## 2020-11-27 RX ADMIN — SODIUM CHLORIDE SCH MLS/HR: 900 INJECTION INTRAVENOUS at 10:21

## 2020-11-27 NOTE — OCCUPATIONAL THER DAILY NOTE
OT Current Status-Daily Note


Subjective


Pt laying in bed, agreeable to OT tx.





ADL-Treatment


Therapy Code Descriptions/Definitions 





Functional [place] Measure:


0=Not Assessed/NA        4=Minimal Assistance


1=Total Assistance        5=Supervision or Setup


2=Maximal Assistance  6=Modified [place]


3=Moderate Assistance 7=Complete IndependenceSCALE: Activities may be completed 

with or without assistive devices.





6-Indepedent-patient completes the activity by him/herself with no assistance 

from a helper.


5-Set-up or Clean-up Assistance-helper sets up or cleans up; patient completes 

activity. [place] assists only prior to or  


    following the activity.


4-Supervision or Touching Assistance-helper provides verbal cues and/or 

touching/steadying and/or contact guard assistance as patient completes 

activity. Assistance may be provided   


    throughout the activity or intermittently.


3-Partial/Moderate Assistance-helper does LESS THAN HALF the effort. [place] 

lifts, holds or supports trunk or limbs, but provides less than half the effort.


2-Substantial/Maximal Assistance-helper does MORE THAN HALF the effort. [place] 

lifts or holds trunk or limbs and provides more than half the effort.


3-Rwzohjifq-rrucip does ALL the effort. Patient does none of the effort to com

plete the activity. Or, the assistance of 2 or more helpers is required for the 

patient to complete the  


    activity.


If activity was not attempted, code reason:


7-Patient Refused.


9-Not Applicable-not attempted and the patient did not perform the activity 

before the current illness, exacerbation or injury.


10-Not Attempted due to Environmental Limitations-(lack of equipment, weather 

restraints, etc.).


88-Not Attempted due to Medical Conditions or Safety Concerns.





Other Treatment


In order to increase BUE strength and functional endurance, OT educated pt on UE

exercises. Pt returned demo, completing x10 reps of the following exercises, 

BUES: shoulder flexion, elbow flexion/extension, and finger flexion/extension. 

OT educated pt on the purpose and benefit of exercises, instructing pt to 

complete a couple times throughout the day, pt verbalized understanding. Post OT

tx, pt laying in bed, call light in reach and all needs met.





Education


OT Patient Education:  Correct positioning, Energy conservation, Exercise 

program, Modified ADL techniques, Progress toward Goal/Update tx plan, Purpose 

of tx/functional activities


Teaching Recipient:  Patient


Teaching Methods:  Discussion


Response to Teaching:  Verbalize Understanding





OT Long Term Goals


Long Term Goals


Time Frame:  Dec 4, 2020


Eating (QC):  5


Oral Hygiene (QC):  5


Toileting Hygiene (QC):  3


Shower/Bathe Self (QC):  3


Upper Body Dressing (QC):  4


Lower Body Dressing (QC):  3


On/Off Footwear (QC):  2


Additional Goals:  1-Demonstrate ADL Tasks, 2-Verbalize Understanding, 3-

ImproveStrength/Star[place]


1=Demonstrate adherence to instructed precautions during ADL tasks.


2=Patient will verbalize/demonstrate understanding of assistive 

devices/modifications for ADL.


3=Patient will improve strength/tolerance for activity to enable patient to 

perform ADL's.





OT Education/Plan


Problem List/Assessment


Assessment:  Decreased Activ Tolerance, Decreased UE Strength, Impaired I ADL's,

Impaired Self-Care Skills





Discharge Recommendations


Plan/Recommendations:  Continue POC





Treatment Plan/Plan of Care


Patient would benefit from OT for education, treatment and training to promote 

independence in ADL's, mobility, safety and/or upper extremity function for 

ADL's.


Plan of Care:  ADL Retraining, Functional Mobility, UE Funct Exercise/Act


Treatment Duration:  Dec 4, 2020


Frequency:  5 times per week


Agreement:  Yes


Rehab Potential:  Guarded





Time/GCodes


Start Time:  11:03


Stop Time:  11:13


Total Time Billed (hr/min):  10


Billed Treatment Time


1, EX











NAGI ORTIZ OT          Nov 27, 2020 11:20

## 2020-11-27 NOTE — NUR
RD ASSESSMENT 



PMHx: HTN; dementia; seizure disorder; GERD; chronic constipation; 



PT INTERACTION: Pt was awake and pleasant during nutrition assessment. Note pt has dementia, 
per chart review. Pt states current current appetite is not good. Note avg PO intake 45% 
x1d, per chart review. Pt states following a regular diet at home, and has no issues with 
chewing/swallowing food. Note pt has dentures, but they are not with her at this time. Pt 
states some recent issues with diarrhea. Note last BM was 11/26, and pt currently on bowel 
regimen of colace BID, and senna BID, per chart review. Pt states unsure of recent wt 
changes. Note unable to determine recent wt hx, per chart review.



ABNORMAL NUTRITION-RELATED LAB VALUES

**Labs WNL at this time



Est. kcal needs: 0176-8977 kcal | 25-30 kcal/kg  

Est. Pro needs:  53-60 g Pro | 0.8-1.0 g Pro/kg 



PES STATEMENT: Inadequate oral intake (NI-2.1) related to loss of appetite, and diarrhea, as 
evidenced by pt interview, and avg PO intake 45% x1d.



Biting/chewing (masticatory) difficulty (NC-1.2) related to no teeth as evidenced by pt 
interview, and pt's dentures not with pt. 



INTERVENTION:  

Continue with current diet order of DYS2 Mechanically Altered diet. 

Add Ensure Enlive (vary) to meals TID, for increased kcal intake. Provides 350 kcal and 20 g 
Pro per serving. 

Encouraged pt to eat when able. 

Will continue to follow and reassess as pt needs, intake, and status change.





HARSHAL Stuart, MS RD LD 

[phone] cell

## 2020-11-27 NOTE — DIAGNOSTIC IMAGING REPORT
INDICATION: Follow-up pneumonia.



COMPARISON: 11/25/2020



FINDINGS: Frontal and lateral radiograph views of the chest were

obtained. Again identified is focal area of consolidation within

the lateral margins of the right upper lobe. There is some

minimal patchy airspace disease within the left base.

Interstitial prominence is improved. There is no large effusion

or pneumothorax. Cardiac silhouette and pulmonary vasculature 

are within normal limits. Osseous structures show no gross acute

abnormalities.



IMPRESSION:

1. Interval improved interstitial pneumonia or edema, but with

persistent areas of alveolar infiltrate in the right upper lobe

and possibly within the left base.



Dictated by: 



  Dictated on workstation # [id number]

## 2020-11-27 NOTE — PHYSICAL THERAPY DAILY NOTE
PT Daily Note-Current


Subjective


Pt is alert and agreeable to treatment.





Mental Status


Patient Orientation:  Person, Place, Situation


Attachments:  IV





Transfers


SCALE: Activities may be completed with or without assistive devices.





6-Indepedent-patient completes the activity by him/herself with no assistance 

from a helper.


5-Set-up or Clean-up Assistance-helper sets up or cleans up; patient completes 

activity. [place] assists only prior to or  


    following the activity.


4-Supervision or Touching Assistance-helper provides verbal cues and/or 

touching/steadying and/or contact guard assistance as patient completes 

activity. Assistance may be provided   


    throughout the activity or intermittently.


3-Partial/Moderate Assistance-helper does LESS THAN HALF the effort. [place] 

lifts, holds or supports trunk or limbs, but provides less than half the effort.


2-Substantial/Maximal Assistance-helper does MORE THAN HALF the effort. [place] 

lifts or holds trunk or limbs and provides more than half the effort.


0-Gfzdguirt-dicjvn does ALL the effort. Patient does none of the effort to 

complete the activity. Or, the assistance of 2 or more helpers is required for 

the patient to complete the  


    activity.


If activity was not attempted, code reason:


7-Patient Refused.


9-Not Applicable-not attempted and the patient did not perform the activity 

before the current illness, exacerbation or injury.


10-Not Attempted due to Environmental Limitations-(lack of equipment, weather 

restraints, etc.).


88-Not Attempted due to Medical Conditions or Safety Concerns.


Roll Left & Right (QC):  6


Sit to Lying (QC):  6


Lying to Sitting/Side of Bed(Q:  6


Sit to Stand (QC):  6





Gait Training


Does the Patient Walk?:  Yes


Distance:  30ft


Walk 10 feet (QC):  3


Gait Persons Needed:  1


Gait Assistive Device:  FWW


(L) LE instability throughout gait training.  Pt later indicates that she 

primarily uses the wheelchair in the home and community.





Wheelchair Training


Does the Pt Use a Wheelchair?:  Yes


Type of Wheelchair:  Manual





Assessment


Current Status:  Good Progress


Pt showed good improvement with regards to bed mobility, transfers, and 

ambulation relative to the first 2 PT entries.  She was able to safely perform 

bed mobility and sit to stand transfer without assistance.  She was unsteady 

with ambulation and the (L) LE was not able to hold her multiple times when 

walking.  Pt will be using the wheelchair as her primary mode of mobility.





PT Long Term Goals


Long Term Goals


PT Long Term Goals Time Frame:  Dec 2, 2020


Roll Left & Right (QC):  3


Sit to Lying (QC):  3


Lying-Sitting on Side/Bed(QC):  3


Sit to Stand (QC):  3


Chair/Bed-to-Chair Xfer(QC):  3





PT Plan


Treatment/Plan


Treatment Plan:  Continue Plan of Care


Treatment Plan:  Bed Mobility, Education, Functional Activity Starla, Functional 

Strength, Safety, Therapeutic Exercise, Transfers


Treatment Duration:  Dec 2, 2020


Frequency:  6 times per week


Estimated Hrs Per Day:  .25 hour per day


Patient and/or Family Agrees t:  Yes





Discharge Recommendations


Plan


Pt is discharging to [facility].





Time/GCodes


Time In:  1020


Time Out:  1035


Total Billed Treatment Time:  15


Total Billed Treatment


1, gt 15











ANAND ZHANG PT            Nov 27, 2020 10:44

## 2020-11-27 NOTE — DISCHARGE SUMMARY
Discharge Summary


Hospital Course


Was the Problem List Reviewed?:  Yes


Hospital Course


Date of Admission: 2020 at 12:50 


Admission Diagnosis :  





Family Physician/Provider: Anthony Koenig MD  





Date of Discharge: 20 


Discharge Diagnosis: 


1.  Acute Hypoxia--started on oxygen in ER but was off oxygen within 24hrs and 

has been stable, COVID negative x2 


2.  Acute UTI--treated with rocephin


3.  Acute Pneumonia--afebrile and normal WBC with no cough or shortness of air 

and not requiring oxygen


4.  History of Seizure Disorder--home meds restarted


5.  Dementia--home meds restarted











Hospital Course:


This is an 85-year-old female who is a resident of a NH in [facility] who was 

brought to the ER via EMS with initial concern for stroke.  Her past medical 

history is significant for seizure disorders and it was stated she had lost 

bladder control and was noted to have altered mental status with weakness of one

side of her face.  On EMS arrival, the patient was alert and oriented without an

y complaint or obvious neurologic deficit.  She was found to have slight hypoxia

with saturations 88 percent and was started on oxygen.  She was also found to 

have a UTI and patchy infiltrates on CXR.  She was admitted to the medical floor

and treated with IV rocephin.  She was able to be weaned off of oxygen and her 

O2 sat remained stable on room air during the rest of her hospital stay.  She 

remained afebrile and her WBC count was normal.  She was COVID negative x2.  She

was confused but pleasant and continued to ask for her teeth and to go home.  On

the morning of discharge, she was resting comfortably in bed on room air.  She 

does have some crackles in her lungs but no respiratory distress.  She was given

her IV Rocephin and a dose of IV zithromax and will be discharged back to the 

nursing home on oral cefdinir and zithromax and a albuterol inhaler.














Labs and Pending Lab Test:


Laboratory Tests


20 04:45: 


White Blood Count 9.0, Red Blood Count 3.76L, Hemoglobin 13.1, Hematocrit 40, 

Mean Corpuscular Volume 107H, Mean Corpuscular Hemoglobin 35H, Mean Corpuscular 

Hemoglobin Concent 32, Red Cell Distribution Width 12.7, Platelet Count 304, 

Mean Platelet Volume 9.0, Immature Granulocyte % (Auto) 1, Neutrophils (%) 

(Auto) 58, Lymphocytes (%) (Auto) 27, Monocytes (%) (Auto) 11, Eosinophils (%) 

(Auto) 3, Basophils (%) (Auto) 1, Neutrophils # (Auto) 5.2, Lymphocytes # (Auto)

2.4, Monocytes # (Auto) 1.0, Eosinophils # (Auto) 0.2, Basophils # (Auto) 0.1, 

Immature Granulocyte # (Auto) 0.1, Sodium Level 137, Potassium Level 4.2, C

hloride Level 103, Carbon Dioxide Level 23, Anion Gap 11, Blood Urea Nitrogen 9,

Creatinine 0.74, Estimat Glomerular Filtration Rate > 60, BUN/Creatinine Ratio 

12, Glucose Level 91, Calcium Level 8.9





Microbiology


20 Blood Culture - Preliminary, Resulted


           No growth


20 Urine Culture - Preliminary, Resulted


           Escherichia coli





Home Meds


Active


Zithromax (Azithromycin) 250 Mg Tablet 250 Mg PO DAILY


Cefdinir 300 Mg Capsule 300 Mg PO BID 7 Days


Proair Hfa (Albuterol Sulfate) 1 Puff Puff 2 Puff IH QID


     1 PUFF = 90 MCG


Ferrous Sulfate 325 Mg Tablet 325 Mg PO DAILY


Reported


Meloxicam 15 Mg Tablet 15 Mg PO 1200


Megestrol Acetate 400 Mg/10 Ml Oral.susp 10 Ml PO DAILY


Levothyroxine Sodium 25 Mcg Tablet 25 Mcg PO DAILY


Alendronate Sodium 70 Mg Tablet 70 Mg PO WED


Prozac (Fluoxetine HCl) 20 Mg Capsule 20 Mg PO DAILY


Vitamin D3 (Cholecalciferol (Vitamin D3)) 25 Mcg Capsule 25 Mcg PO DAILY


Calcium 600 + Vit D 200 Tablet (Calcium Carbonate/Vitamin D3) 1 Each Tablet 1 

Each PO DAILY


Aspirin EC (Aspirin) 325 Mg Tablet.dr 325 Mg PO DAILY


Antacid (Calcium Carbonate) 200 Mg Tab.chew 200 Mg PO Q2H PRN


Tylenol (Acetaminophen) 325 Mg Capsule 650 Mg PO Q6H PRN


Xanax (Alprazolam) 0.25 Mg Tablet 0.25 Mg PO HS


Phenobarbital 64.8 Mg Tablet 97.2 Mg PO HS


     TAKES 1 &  (64.8MG) TAB


Docusate Sodium 100 Mg Capsule 100 Mg PO BID


Trazodone HCl 100 Mg Tablet 100 Mg PO HS


Multivitamin 1 Each Tablet 1 Each PO DAILY


Miralax (Polyethylene Glycol 3350) 17 Gm Powd.pack 17 Gm PO DAILY


Tussin (Guaifenesin) 100 Mg/5 Ml Liquid 10 Ml PO Q4H PRN


Milk of Magnesia (Magnesium Hydroxide) 400 Mg/5 Ml Oral.susp 30 Ml PO DAILY PRN


Norco  Tablet (Hydrocodone/Acetaminophen) 1 Each Tablet 1 Tab PO Q4H PRN 

MDD 5 TABS


Assessment/Pt Instructions


1.  Acute Hypoxia--started on oxygen in ER but was off oxygen within 24hrs and 

has been stable, COVID negative x2 


2.  Acute UTI--treated with rocephin


3.  Acute Pneumonia--afebrile and normal WBC with no cough or shortness of air 

and not requiring oxygen


4.  History of Seizure Disorder--home meds restarted


5.  Dementia--home meds restarted


Discharge Planning:  <30 minutes discharge planning





Discharge Instructions


Discharge Diet:  No Restrictions


Activity as Tolerated:  Yes





Discharge Physical Examination


Vital Signs





Vital Signs








  Date Time  Temp Pulse Resp B/P (MAP) Pulse Ox O2 Delivery O2 Flow Rate FiO2


 


20 08:00     9 Room Air  


 


20 07:57 36.2 83 18 135/74 (94)    


 


20 08:00       2.00 








General Appearance:  No Apparent Distress


Respiratory:  Crackles


Cardiovascular:  Regular Rate, Rhythm, Systolic Murmur


Gastrointestinal:  Normal Bowel Sounds, Non Tender, Soft


Extremity:  Non Tender, No Calf Tenderness, No Pedal Edema


Skin:  Warm/Dry


Neurologic/Psychiatric:  Alert, Oriented x3


Allergies:  


Coded Allergies:  


     morphine (Verified  Allergy, Unknown, 20)





Discharge Summary


Date of Admission


2020 at 12:50


Date of Discharge





Discharge Date:  2020





Clinical Quality Measures


DVT/VTE Risk/Contraindication:


Risk Factor Score Per Nursin


RFS Level Per Nursing on Admit:  2=Moderate











OMAYRA COTTER DO        2020 10:38

## 2020-11-27 NOTE — NUR
ANTHONY SANDERS discharged to [facility]. [facility] notified of discharge and report given to 
AISHWARYA RICHARDSON. ANTHONY SANDERS belongings sent with PT. Skin dry and intact; no breakdown noted.  
Vital signs are stable at time of discharge. Condition is stable at time of discharge. 
Discharge instructions and copies of H&P, discharge summary, physician's order, lab reports, 
consultation reports, other dictated reports, diagnostic imaging reports, Advance Directive, 
eMAR, vital signs, intake and output sent with PT/FAXED. Patient discharged from 1 on  at 
1335.  ANTHONY SANDERS left floor via WC, accompanied by STAFF.  ANTHONY SANDERS and family/DPOA 
notified and verbalize understanding of discharge to [facility].

## 2020-11-27 NOTE — NUR
DISCHARGE PLANNING:  PATIENT WILL DISCHARGE TODAY BACK TO HER PREVIOUS PLACEMENT AT 
[facility].  TRANSPORT WILL BE HERE BETWEEN 12:30 AND 1 PM. DEBRA TRIPLETT AWARE.

## 2021-03-05 ENCOUNTER — HOSPITAL ENCOUNTER (EMERGENCY)
Dept: HOSPITAL 75 - ER FS | Age: 86
Discharge: HOME | End: 2021-03-05
Payer: MEDICARE

## 2021-03-05 VITALS — SYSTOLIC BLOOD PRESSURE: 146 MMHG | DIASTOLIC BLOOD PRESSURE: 75 MMHG

## 2021-03-05 DIAGNOSIS — G40.909: ICD-10-CM

## 2021-03-05 DIAGNOSIS — R09.02: Primary | ICD-10-CM

## 2021-03-05 DIAGNOSIS — R91.8: ICD-10-CM

## 2021-03-05 DIAGNOSIS — Z88.5: ICD-10-CM

## 2021-03-05 DIAGNOSIS — F32.9: ICD-10-CM

## 2021-03-05 DIAGNOSIS — Z79.82: ICD-10-CM

## 2021-03-05 LAB
ALBUMIN SERPL-MCNC: 4.1 GM/DL (ref 3.2–4.5)
ALP SERPL-CCNC: 59 U/L (ref 40–136)
ALT SERPL-CCNC: 14 U/L (ref 0–55)
AMORPH SED URNS QL MICRO: (no result) /LPF
APTT PPP: YELLOW S
BACTERIA #/AREA URNS HPF: (no result) /HPF
BASOPHILS # BLD AUTO: 0.1 10^3/UL (ref 0–0.1)
BASOPHILS NFR BLD AUTO: 0 % (ref 0–10)
BILIRUB SERPL-MCNC: 0.2 MG/DL (ref 0.1–1)
BILIRUB UR QL STRIP: NEGATIVE
BLD SMEAR INTERP: (no result)
BUN/CREAT SERPL: 26
CALCIUM SERPL-MCNC: 9.8 MG/DL (ref 8.5–10.1)
CHLORIDE SERPL-SCNC: 98 MMOL/L (ref 98–107)
CO2 SERPL-SCNC: 31 MMOL/L (ref 21–32)
CREAT SERPL-MCNC: 0.87 MG/DL (ref 0.6–1.3)
EOSINOPHIL # BLD AUTO: 0.4 10^3/UL (ref 0–0.3)
EOSINOPHIL NFR BLD AUTO: 4 % (ref 0–10)
FIBRINOGEN PPP-MCNC: (no result) MG/DL
GFR SERPLBLD BASED ON 1.73 SQ M-ARVRAT: > 60 ML/MIN
GLUCOSE SERPL-MCNC: 93 MG/DL (ref 70–105)
GLUCOSE UR STRIP-MCNC: NEGATIVE MG/DL
HCT VFR BLD CALC: 40 % (ref 35–52)
HGB BLD-MCNC: 12.8 G/DL (ref 11.5–16)
HYALINE CASTS #/AREA URNS LPF: (no result) /LPF
KETONES UR QL STRIP: (no result)
LEUKOCYTE ESTERASE UR QL STRIP: NEGATIVE
LYMPHOCYTES # BLD AUTO: 2 X 10^3 (ref 1–4)
LYMPHOCYTES NFR BLD AUTO: 17 % (ref 12–44)
MANUAL DIFFERENTIAL PERFORMED BLD QL: NO
MCH RBC QN AUTO: 35 PG (ref 25–34)
MCHC RBC AUTO-ENTMCNC: 32 G/DL (ref 32–36)
MCV RBC AUTO: 109 FL (ref 80–99)
MONOCYTES # BLD AUTO: 1.4 X 10^3 (ref 0–1)
MONOCYTES NFR BLD AUTO: 11 % (ref 0–12)
NEUTROPHILS # BLD AUTO: 8.1 X 10^3 (ref 1.8–7.8)
NEUTROPHILS NFR BLD AUTO: 68 % (ref 42–75)
NITRITE UR QL STRIP: NEGATIVE
PH UR STRIP: 6 [PH] (ref 5–9)
PLATELET # BLD: 261 10^3/UL (ref 130–400)
PMV BLD AUTO: 9.4 FL (ref 7.4–10.4)
POTASSIUM SERPL-SCNC: 4.4 MMOL/L (ref 3.6–5)
PROT SERPL-MCNC: 7.2 GM/DL (ref 6.4–8.2)
PROT UR QL STRIP: (no result)
RBC #/AREA URNS HPF: (no result) /HPF
SODIUM SERPL-SCNC: 138 MMOL/L (ref 135–145)
SP GR UR STRIP: 1.02 (ref 1.02–1.02)
SQUAMOUS #/AREA URNS HPF: (no result) /HPF
WBC # BLD AUTO: 12.1 10^3/UL (ref 4.3–11)
WBC #/AREA URNS HPF: (no result) /HPF

## 2021-03-05 PROCEDURE — 81000 URINALYSIS NONAUTO W/SCOPE: CPT

## 2021-03-05 PROCEDURE — 80053 COMPREHEN METABOLIC PANEL: CPT

## 2021-03-05 PROCEDURE — 87077 CULTURE AEROBIC IDENTIFY: CPT

## 2021-03-05 PROCEDURE — 71045 X-RAY EXAM CHEST 1 VIEW: CPT

## 2021-03-05 PROCEDURE — 72125 CT NECK SPINE W/O DYE: CPT

## 2021-03-05 PROCEDURE — 71260 CT THORAX DX C+: CPT

## 2021-03-05 PROCEDURE — 83605 ASSAY OF LACTIC ACID: CPT

## 2021-03-05 PROCEDURE — 85025 COMPLETE CBC W/AUTO DIFF WBC: CPT

## 2021-03-05 PROCEDURE — 70450 CT HEAD/BRAIN W/O DYE: CPT

## 2021-03-05 PROCEDURE — 36415 COLL VENOUS BLD VENIPUNCTURE: CPT

## 2021-03-05 PROCEDURE — 87088 URINE BACTERIA CULTURE: CPT

## 2021-03-05 PROCEDURE — 87040 BLOOD CULTURE FOR BACTERIA: CPT

## 2021-03-05 PROCEDURE — 72170 X-RAY EXAM OF PELVIS: CPT

## 2021-03-05 NOTE — ED TRAUMA-MULTISYSTEM
General


Chief Complaint:  Trauma-Non Activation


Stated Complaint:  FALL


Source of Information:  Patient, EMS


Exam Limitations:  No Limitations





History of Present Illness


Date Seen by Provider:  Mar 5, 2021


Time Seen by Provider:  10:59


Initial Comments


85-year-old female presents from the nursing home by EMS after a fall.  Patient 

was sitting in recliner and evidently the back of the recliner broke off and she

fell backwards landing on the floor.  Patient complains of pain "all over".  No 

gross deformity and only other concern is she may have a UTI as she is 

incontinent and she gets them periodically.  No LOC or mental status change.  No

recent illness.





Allergies and Home Medications


Allergies


Coded Allergies:  


     morphine (Verified  Allergy, Unknown, 11/25/20)





Home Medications


Acetaminophen 325 Mg Capsule, 650 MG PO Q6H PRN for PAIN-MILD (1-4) OR 

TEMPATURE, (Reported)


Albuterol Sulfate 1 Puff Puff, 2 PUFF IH QID


   1 PUFF = 90 MCG 


   Prescribed by: OMAYRA COTTER on 11/27/20 1023


Alendronate Sodium 70 Mg Tablet, 70 MG PO WED, (Reported)


Alprazolam 0.25 Mg Tablet, 0.25 MG PO HS, (Reported)


Aspirin 325 Mg Tablet.dr, 325 MG PO DAILY, (Reported)


Azithromycin 250 Mg Tablet, 250 MG PO DAILY


   Prescribed by: OMAYRA COTTER on 11/27/20 1029


Calcium Carbonate 200 Mg Tab.chew, 200 MG PO Q2H PRN for INDIGESTION, (Reported)


Calcium Carbonate/Vitamin D3 1 Each Tablet, 1 EACH PO DAILY, (Reported)


Cefdinir 300 Mg Capsule, 300 MG PO BID


   Prescribed by: OMAYRA COTTER on 11/27/20 1029


Cholecalciferol (Vitamin D3) 25 Mcg Capsule, 25 MCG PO DAILY, (Reported)


Docusate Sodium 100 Mg Capsule, 100 MG PO BID, (Reported)


Ferrous Sulfate 325 Mg Tablet, 325 MG PO DAILY


   Prescribed by: OMAYRA COTTER on 11/27/20 1023


Fluoxetine HCl 20 Mg Capsule, 20 MG PO DAILY, (Reported)


Guaifenesin 100 Mg/5 Ml Liquid, 10 ML PO Q4H PRN for COUGH, (Reported)


Hydrocodone/Acetaminophen 1 Each Tablet, 1 TAB PO Q4H PRN for PAIN-MODERATE, 

(Reported)


Levofloxacin 500 Mg Tablet, 500 MG PO DAILY


   Prescribed by: GAYLE CHO on 3/5/21 1410


Levothyroxine Sodium 25 Mcg Tablet, 25 MCG PO DAILY, (Reported)


Magnesium Hydroxide 400 Mg/5 Ml Oral.susp, 30 ML PO DAILY PRN for CONSTIPATION-

7TH LINE, (Reported)


Megestrol Acetate 400 Mg/10 Ml Oral.susp, 10 ML PO DAILY, (Reported)


Multivitamin 1 Each Tablet, 1 EACH PO DAILY, (Reported)


Phenobarbital 64.8 Mg Tablet, 97.2 MG PO HS, (Reported)


   TAKES 1 &  (64.8MG) TAB 


Polyethylene Glycol 3350 17 Gm Powd.pack, 17 GM PO DAILY, (Reported)


Trazodone HCl 100 Mg Tablet, 100 MG PO HS, (Reported)





Patient Home Medication List


Home Medication List Reviewed:  Yes





Review of Systems


Review of Systems


Constitutional:  No chills, No fever; malaise (but possibly her baseline)


Respiratory:  No cough, No short of breath


Cardiovascular:  Denies Chest Pain, Denies Syncope


Gastrointestinal:  No abdominal pain, No vomiting


Musculoskeletal:  see HPI, joint pain; No joint swelling; other (hurts all over)


Skin:  No change in color, No lesions, No lumps, No rash





Past Medical-Social-Family Hx


Past Med/Social Hx:  Reviewed Nursing Past Med/Soc Hx


Patient Social History


2nd Hand Smoke Exposure:  No


Recent Hopitalizations:  No





Immunizations Up To Date


Date of Pneumonia Vaccine:  Feb 10, 2017


Date of Influenza Vaccine:  Oct 24, 2019





Seasonal Allergies


Seasonal Allergies:  No





Past Medical History


Surgeries:  Yes (left leg fracture with extensive repair)


Orthopedic


Respiratory:  No


Hypertension


Neurological:  Yes


Dementia, Seizure Disorder


Genitourinary:  No


Gastrointestinal:  Yes


Gastroesophageal Reflux, Chronic Constipation


Musculoskeletal:  Yes (left leg fracture with muscle wasting, generalized 

weakness)


Endocrine:  No


HEENT:  No


Cancer:  No


Psychosocial:  Yes


Depression


Integumentary:  No


Blood Disorders:  Yes (hemolytic anemia)





Physical Exam


Vital Signs





Vital Signs - First Documented








 3/5/21





 10:50


 


Temp 36.5


 


Pulse 69


 


Resp 20


 


B/P (MAP) 133/82 (99)


 


Pulse Ox 94


 


O2 Delivery Nasal Cannula








Height, Weight, BMI


Height: '"


Weight: lbs. oz. kg; 24.88 BMI


Method:


General Appearance:  No Apparent Distress, WD/WN


Head:  No Evidence of Injury, Tenderness (all over); No Active Bleeding, No 

Abel's Sign, No Contusions, No Ecchymosis, No Lacerations, No Raccoon Eyes, No

Swelling


Eyes:  Bilateral Eye Normal Inspection, Bilateral Eye PERRL, Bilateral Eye EOMI


Ears, Nose, Throat:  Hearing Grossly Normal, No Evidence of ENT Injury


Neck:  Supple, Tender Midline


Cardiovascular:  Regular Rate, Rhythm, No JVD


Respiratory:  Chest Non Tender, Lungs Clear


Gastrointestinal:  Non Tender, Soft


Back:  Normal Inspection, No Vertebral Tenderness


Extremity:  Normal Capillary Refill, No Calf Tenderness, Other (tenderness left 

hip)


Neurologic/Psychiatric:  No Motor/Sensory Deficits, Normal Mood/Affect


Skin:  Normal Color, Warm/Dry





Focused Exam


Lactate Level


3/5/21 11:59: Lactic Acid Level 0.90





Lactic Acid Level





Laboratory Tests








Test


 3/5/21


11:59


 


Lactic Acid Level


 0.90 MMOL/L


(0.50-2.00)











Progress/Results/Core Measures


Results/Orders


Lab Results





Laboratory Tests








Test


 3/5/21


11:03 3/5/21


11:59 Range/Units


 


 


Urine Color YELLOW    


 


Urine Clarity CLOUDY    


 


Urine pH 6.0   5-9  


 


Urine Specific Gravity 1.020   1.016-1.022  


 


Urine Protein TRACE H  NEGATIVE  


 


Urine Glucose (UA) NEGATIVE   NEGATIVE  


 


Urine Ketones TRACE H  NEGATIVE  


 


Urine Nitrite NEGATIVE   NEGATIVE  


 


Urine Bilirubin NEGATIVE   NEGATIVE  


 


Urine Urobilinogen 0.2   < = 1.0  MG/DL


 


Urine Leukocyte Esterase NEGATIVE   NEGATIVE  


 


Urine RBC (Auto) NEGATIVE   NEGATIVE  


 


Urine RBC RARE    /HPF


 


Urine WBC 2-5    /HPF


 


Urine Squamous Epithelial


Cells RARE 


 


  /HPF





 


Urine Crystals PRESENT H   /LPF


 


Urine Amorphous Sediment


 FEW ELINA


URATES H 


  /LPF





 


Urine Bacteria FEW H   /HPF


 


Urine Casts PRESENT    /LPF


 


Urine Hyaline Casts 0-2 H   /LPF


 


Urine Mucus SMALL H   /LPF


 


Urine Culture Indicated YES    


 


White Blood Count


 


 12.1 H


 4.3-11.0


10^3/uL


 


Red Blood Count


 


 3.67 L


 4.35-5.85


10^6/uL


 


Hemoglobin  12.8  11.5-16.0  G/DL


 


Hematocrit  40  35-52  %


 


Mean Corpuscular Volume  109 H 80-99  FL


 


Mean Corpuscular Hemoglobin  35 H 25-34  PG


 


Mean Corpuscular Hemoglobin


Concent 


 32 


 32-36  G/DL





 


Red Cell Distribution Width  13.6  10.0-14.5  %


 


Platelet Count


 


 261 


 130-400


10^3/uL


 


Mean Platelet Volume  9.4  7.4-10.4  FL


 


Immature Granulocyte % (Auto)  1   %


 


Neutrophils (%) (Auto)  68  42-75  %


 


Lymphocytes (%) (Auto)  17  12-44  %


 


Monocytes (%) (Auto)  11  0-12  %


 


Eosinophils (%) (Auto)  4  0-10  %


 


Basophils (%) (Auto)  0  0-10  %


 


Neutrophils # (Auto)  8.1 H 1.8-7.8  X 10^3


 


Lymphocytes # (Auto)  2.0  1.0-4.0  X 10^3


 


Monocytes # (Auto)  1.4 H 0.0-1.0  X 10^3


 


Eosinophils # (Auto)


 


 0.4 H


 0.0-0.3


10^3/uL


 


Basophils # (Auto)


 


 0.1 


 0.0-0.1


10^3/uL


 


Immature Granulocyte # (Auto)


 


 0.1 


 0.0-0.1


10^3/uL


 


Sodium Level  138  135-145  MMOL/L


 


Potassium Level  4.4  3.6-5.0  MMOL/L


 


Chloride Level  98    MMOL/L


 


Carbon Dioxide Level  31  21-32  MMOL/L


 


Anion Gap  9  5-14  MMOL/L


 


Blood Urea Nitrogen  23 H 7-18  MG/DL


 


Creatinine


 


 0.87 


 0.60-1.30


MG/DL


 


Estimat Glomerular Filtration


Rate 


 > 60 


  





 


BUN/Creatinine Ratio  26   


 


Glucose Level  93    MG/DL


 


Lactic Acid Level


 


 0.90 


 0.50-2.00


MMOL/L


 


Calcium Level  9.8  8.5-10.1  MG/DL


 


Corrected Calcium  9.7  8.5-10.1  MG/DL


 


Total Bilirubin  0.2  0.1-1.0  MG/DL


 


Aspartate Amino Transf


(AST/SGOT) 


 16 


 5-34  U/L





 


Alanine Aminotransferase


(ALT/SGPT) 


 14 


 0-55  U/L





 


Alkaline Phosphatase  59    U/L


 


Total Protein  7.2  6.4-8.2  GM/DL


 


Albumin  4.1  3.2-4.5  GM/DL


 


Smear Scan  OK   








My Orders





Orders - ROVENSTINE,GAYLE L DO


Urinalysis (3/5/21 10:57)


Chest 1 View Ap/Pa Only (3/5/21 10:57)


Pelvis (Ap) (3/5/21 10:57)


Ct Head/Cervical Spine Wo (3/5/21 10:57)


Straight Cath (Urinary) (3/5/21 11:18)


Urine Culture (3/5/21 11:03)


Ed Iv/Invasive Line Start (3/5/21 11:57)


Cbc With Automated Diff (3/5/21 11:57)


Comprehensive Metabolic Panel (3/5/21 11:57)


Lactic Acid Analyzer (3/5/21 11:57)


Blood Culture (3/5/21 11:57)


Blood Culture (3/5/21 12:05)


Ct Chest W (3/5/21 12:13)


Iohexol Injection (Omnipaque 350 Mg/Ml 1 (3/5/21 12:30)


Received Contrast (Hold Metformin- Contr (3/5/21 12:30)


Sodium Chloride Flush (Catheter Flush Sy (3/5/21 12:30)


Ns (Ivpb) (Sodium Chloride 0.9% Ivpb Bag (3/5/21 12:30)





Medications Given in ED





Current Medications








 Medications  Dose


 Ordered  Sig/Alen


 Route  Start Time


 Stop Time Status Last Admin


Dose Admin


 


 Iohexol  75 ml  ONCE  ONCE


 IV  3/5/21 12:30


 3/5/21 12:31 DC 3/5/21 13:11


75 ML


 


 Sodium Chloride  10 ml  AS NEEDED  PRN


 IV  3/5/21 12:30


 3/5/21 15:20 DC 3/5/21 13:11


10 ML


 


 Sodium Chloride  100 ml  ONCE  ONCE


 IV  3/5/21 12:30


 3/5/21 12:31 DC 3/5/21 13:11


100 ML








Vital Signs/I&O











 3/5/21 3/5/21





 10:50 15:14


 


Temp 36.5 36.4


 


Pulse 69 63


 


Resp 20 20


 


B/P (MAP) 133/82 (99) 146/75


 


Pulse Ox 94 97


 


O2 Delivery Nasal Cannula Room Air











Progress


Progress Note :  


Progress Note


Called patient's primary care doctor, Dr. Owens to discuss patient presentation 

after fall without any respiratory complaints. Also explained findings on her 

chest x-ray which led to a CT with contrast to evaluate a right lung infiltrate 

which had been present since November. These findings did not reveal any ominous

signs of metastatic etiology. However patient is a bit hypoxic on room air and 

has not previously had this problem. He and I discussed placing her Levaquin as 

well as oxygen in the nursing home and he will call the nursing home to advise 

on this and I will get things started with an order. He will follow-up with her 

status soon.





Diagnostic Imaging





Comments


IMPRESSION:  


1. No hemorrhage or focal intra-axial mass.  No CT evidence of


large acute territorial ischemia.  


2. No acute fracture or dislocation in the cervical spine.


3. Likely prior craniotomy changes in the right parietal lobe


with cystic prominence in the right frontoparietal region.


Recommend correlation with patient history of prior surgery.


4. Patchy opacities in the bilateral lung apices, which may


represent inflammatory or infectious process.


5. Grade 1 anterolisthesis of L5 on S1.





  Dictated on workstation # [id number]








Dict:   03/05/21 [id number]


Trans:   03/05/21 1141


[facility] [id number]





Interpreted by:     ANGELA WEBB DO


Electronically signed by:  


_____________________________________________________________________________





IMPRESSION: Persistent focal opacity within the peripheral right


midlung. This has not significantly changed since November 2020.


A dedicated CT of the chest, preferably with contrast, is


recommended for further evaluation. This could simply relate to a


chronic infiltrate, though malignancy needs to be excluded.





Compression deformities within the mid-to-lower thoracic spine.


Many of these were present on prior imaging, though exact


comparison is difficult. If there is clinical concern for the


thoracic spine, this could be further evaluated on a CT of the


chest versus thoracic spine.





Cardiomegaly with minimal central pulmonary vascular congestion.





Additional postsurgical and chronic findings, as above.





  Dictated on workstation # [id number]








Dict:   03/05/21 [id number]


Trans:   03/05/21 1157


[facility] [id number]





Interpreted by:     UZIEL SUAREZ MD


Electronically signed by:  


____________________________________________________________





Impression: Demineralized bones. Old appearing deformities to the


left pubic ring and a replaced right hip. No acute radiographic


abnormality appreciable.





  Dictated on workstation # [id number]








Dict:   03/05/21 [id number]


Trans:   03/05/21 1144


[facility] [id number]





Interpreted by:     ASAD LEE


Electronically signed by:





Departure


Impression





   Primary Impression:  


   Fall from ground level


   Additional Impressions:  


   Hypoxia


   Infiltrate of lung present on chest x-ray


Disposition:  01 HOME, SELF-CARE


Condition:  Stable





Departure-Patient Inst.


Decision time for Depature:  14:08


Referrals:  


DORIS OWENS MD (PCP/Family)


Primary Care Physician


Patient Instructions:  Community-Acquired Pneumonia in Adults





Add. Discharge Instructions:  


Follow up with Dr Owens in 1 week for any other concerns.





All discharge instructions reviewed with patient and/or family. Voiced 

understanding.


Scripts


Levofloxacin (Levofloxacin) 500 Mg Tablet


500 MG PO DAILY, #7 TAB


   Prov: GAYLE CHO DO         3/5/21











GAYLE CHO DO          Mar 5, 2021 11:04

## 2021-03-05 NOTE — DIAGNOSTIC IMAGING REPORT
PROCEDURE: CT head and CT cervical spine without contrast.



TECHNIQUE: Multiple contiguous axial images were obtained through

the brain and cervical spine without the use of intravenous

contrast. Sagittal and coronal reformations through the cervical

spine were then performed. Auto Exposure Controls were utilized

during the CT exam to meet ALARA standards for radiation dose

reduction. 



INDICATION: Fall. Head and neck pain. Scalp contusion.



COMPARISON: None.



FINDINGS: 

CT head: No large acute territorial ischemia or hemorrhage. No

midline shift or mass effect. There is cystic prominence in the

right frontoparietal region within the central sulcus measuring

approximately 3.2 x 2.4 cm and 2.9 cm craniocaudal. Decreased

attenuation is seen in the periventricular and subcortical white

matter. The ventricles and cortical sulci are prominent. The

basilar cisterns are patent and unremarkable. 



The calvarium is intact. Likely prior craniotomy changes are seen

in the right parietal region. The visualized paranasal sinuses

are clear.



CT cervical spine: No acute fracture or dislocation is seen in

the cervical spine. No focal osseous lesions. There is grade 1

anterolisthesis of C5 on C6. Vertebral body heights are

well-maintained. The craniocervical junction is well-maintained.

Mild degenerative changes are seen in the cervical spine with

disc osteophyte complexes and uncovertebral arthropathy. Soft

tissues of the neck are unremarkable. Patchy opacities are seen

in the bilateral lung apices.



IMPRESSION:  

1. No hemorrhage or focal intra-axial mass.  No CT evidence of

large acute territorial ischemia.  

2. No acute fracture or dislocation in the cervical spine.

3. Likely prior craniotomy changes in the right parietal lobe

with cystic prominence in the right frontoparietal region.

Recommend correlation with patient history of prior surgery.

4. Patchy opacities in the bilateral lung apices, which may

represent inflammatory or infectious process.

5. Grade 1 anterolisthesis of L5 on S1.



Dictated by: 



  Dictated on workstation # [id number]

## 2021-03-05 NOTE — DIAGNOSTIC IMAGING REPORT
INDICATION: Fall, infiltrate.



COMPARISON: November 27, 2020.



TECHNIQUE: Single frontal radiograph of the chest dated March 05, 2021.



FINDINGS: The cardiac silhouette is enlarged, though stable.

Minimal central pulmonary vascular congestion. Postsurgical

changes again noted overlying the expected location of the GE

junction. Dense consolidation within the peripheral aspect of the

right midlung is again identified and stable from the prior

examination. Mild bilateral interstitial opacities are again

noted, similar to the prior examination. Retrocardiac density is

present, similar to the prior examination, felt to relate to

tortuosity of the descending thoracic aorta and possible hiatal

hernia. No large-volume pleural effusion. No pneumothorax.

Multiple compression deformities are noted within the

mid-to-lower thoracic spine. Many of these were present on prior

imaging, though exact comparison is difficult, particularly given

lack of the lateral radiographs.



IMPRESSION: Persistent focal opacity within the peripheral right

midlung. This has not significantly changed since November 2020.

A dedicated CT of the chest, preferably with contrast, is

recommended for further evaluation. This could simply relate to a

chronic infiltrate, though malignancy needs to be excluded.



Compression deformities within the mid-to-lower thoracic spine.

Many of these were present on prior imaging, though exact

comparison is difficult. If there is clinical concern for the

thoracic spine, this could be further evaluated on a CT of the

chest versus thoracic spine.



Cardiomegaly with minimal central pulmonary vascular congestion.



Additional postsurgical and chronic findings, as above.



Dictated by: 



  Dictated on workstation # [id number]

## 2021-03-05 NOTE — DIAGNOSTIC IMAGING REPORT
Indication: Fall pain



No relevant comparison 



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

There is some old healed deformity to the left pubic ring. An

acute appearing fracture is not identified however. We knowledge

bony demineralization. 



Impression: Demineralized bones. Old appearing deformities to the

left pubic ring and a replaced right hip. No acute radiographic

abnormality appreciable.



Dictated by: 



  Dictated on workstation # [id number]

## 2021-03-05 NOTE — DIAGNOSTIC IMAGING REPORT
PROCEDURE: CT chest with contrast only.



TECHNIQUE: Multiple contiguous axial images were obtained through

the chest after administration of intravenous contrast. Auto

Exposure Controls were utilized during the CT exam to meet ALARA

standards for radiation dose reduction. 



INDICATION:  Right chest opacity.



Correlation is made with chest radiograph from earlier same day.

No prior chest CT is available for comparison.



FINDINGS: No axillary lymphadenopathy is identified. No definite

mediastinal or hilar lymphadenopathy is seen. A normal-sized

lymph node in the AP window is noted. Heart is enlarged. There is

no pericardial or pleural fluid identified. There is an area of

consolidation in the right upper lobe periphery measuring 5.0 x

2.6 cm. There is some surrounding infiltrate present as well.

There appears to be some infiltrate or atelectasis right middle

lobe. There are areas of streaky opacity bilateral lower lobes.

Upper abdomen demonstrates a hyperdense lesion in the anterior

right lobe liver dome measuring 16 mm, indeterminate. This could

represent a liver hemangioma. Midthoracic compression fracture

deformities are noted.



IMPRESSION: Area of parenchymal consolidation in the right upper

lobe periphery mass would be less likely. There is some

surrounding infiltrate as well. Continued close follow-up would

be recommended. If this does not resolve, consideration of

performing PET/CT study could be performed to evaluate for

hypermetabolism.



Dictated by: 



  Dictated on workstation # [id number]

## 2021-04-18 ENCOUNTER — HOSPITAL ENCOUNTER (OUTPATIENT)
Dept: HOSPITAL 75 - LAB FS | Age: 86
End: 2021-04-18
Attending: PEDIATRICS
Payer: MEDICARE

## 2021-04-18 DIAGNOSIS — M54.5: Primary | ICD-10-CM

## 2021-04-18 LAB
APTT PPP: YELLOW S
BACTERIA #/AREA URNS HPF: (no result) /HPF
BILIRUB UR QL STRIP: NEGATIVE
FIBRINOGEN PPP-MCNC: CLEAR MG/DL
GLUCOSE UR STRIP-MCNC: NEGATIVE MG/DL
KETONES UR QL STRIP: NEGATIVE
LEUKOCYTE ESTERASE UR QL STRIP: NEGATIVE
NITRITE UR QL STRIP: NEGATIVE
PH UR STRIP: 6.5 [PH] (ref 5–9)
PROT UR QL STRIP: NEGATIVE
RBC #/AREA URNS HPF: (no result) /HPF
SP GR UR STRIP: 1.02 (ref 1.02–1.02)
WBC #/AREA URNS HPF: (no result) /HPF

## 2021-04-18 PROCEDURE — 81000 URINALYSIS NONAUTO W/SCOPE: CPT

## 2021-04-18 PROCEDURE — 87088 URINE BACTERIA CULTURE: CPT

## 2021-04-28 ENCOUNTER — HOSPITAL ENCOUNTER (EMERGENCY)
Dept: HOSPITAL 75 - ER FS | Age: 86
Discharge: HOME | End: 2021-04-28
Payer: MEDICARE

## 2021-04-28 VITALS — WEIGHT: 132.06 LBS | HEIGHT: 60.98 IN | BODY MASS INDEX: 24.93 KG/M2

## 2021-04-28 VITALS — SYSTOLIC BLOOD PRESSURE: 161 MMHG | DIASTOLIC BLOOD PRESSURE: 103 MMHG

## 2021-04-28 DIAGNOSIS — G40.909: ICD-10-CM

## 2021-04-28 DIAGNOSIS — Z88.5: ICD-10-CM

## 2021-04-28 DIAGNOSIS — F32.9: ICD-10-CM

## 2021-04-28 DIAGNOSIS — I10: ICD-10-CM

## 2021-04-28 DIAGNOSIS — Z79.82: ICD-10-CM
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
## 2021-04-28 DIAGNOSIS — S82.301A: Primary | ICD-10-CM

## 2021-04-28 PROCEDURE — 29515 APPLICATION SHORT LEG SPLINT: CPT

## 2021-04-28 NOTE — ED GENERAL
General


Stated Complaint:  LOWER EXTREMITY PAIN


Source of Information:  Patient, EMS





History of Present Illness


Date Seen by Provider:  Apr 28, 2021


Time Seen by Provider:  21:37


Initial Comments


85-year-old female presents via EMS from [facility] with report of a broken 

leg.  Sent to the ER for fracture care.  Patient with history of previous leg 

fracture and recent fall.  She is mostly bedridden and does not bear weight.  

Pain only of her right leg.  Presents with x-ray radiology report stating acute 

fractures of the medial and posterior distal fibula (with an intramedullary elijah 

from previous fracture).  





Family/power of  does not want surgical or invasive care, she is 

currently stated to be only on palliative nursing care according to report from 

the nursing home.





Allergies and Home Medications


Allergies


Coded Allergies:  


     morphine (Verified  Allergy, Unknown, 11/25/20)





Home Medications


Acetaminophen 325 Mg Capsule, 650 MG PO Q6H PRN for PAIN-MILD (1-4) OR 

TEMPATURE, (Reported)


Albuterol Sulfate 1 Puff Puff, 2 PUFF IH QID


   1 PUFF = 90 MCG 


   Prescribed by: OMAYRA COTTER on 11/27/20 1023


Alendronate Sodium 70 Mg Tablet, 70 MG PO WED, (Reported)


Alprazolam 0.25 Mg Tablet, 0.25 MG PO HS, (Reported)


Aspirin 325 Mg Tablet.dr, 325 MG PO DAILY, (Reported)


Azithromycin 250 Mg Tablet, 250 MG PO DAILY


   Prescribed by: OMAYRA COTTER on 11/27/20 1029


Calcium Carbonate 200 Mg Tab.chew, 200 MG PO Q2H PRN for INDIGESTION, (Reported)


Calcium Carbonate/Vitamin D3 1 Each Tablet, 1 EACH PO DAILY, (Reported)


Cefdinir 300 Mg Capsule, 300 MG PO BID


   Prescribed by: OMAYRA COTTER on 11/27/20 1029


Cholecalciferol (Vitamin D3) 25 Mcg Capsule, 25 MCG PO DAILY, (Reported)


Docusate Sodium 100 Mg Capsule, 100 MG PO BID, (Reported)


Ferrous Sulfate 325 Mg Tablet, 325 MG PO DAILY


   Prescribed by: OMAYRA COTTER on 11/27/20 1023


Fluoxetine HCl 20 Mg Capsule, 20 MG PO DAILY, (Reported)


Guaifenesin 100 Mg/5 Ml Liquid, 10 ML PO Q4H PRN for COUGH, (Reported)


Hydrocodone/Acetaminophen 1 Each Tablet, 1 TAB PO Q4H PRN for PAIN-MODERATE, 

(Reported)


Levofloxacin 500 Mg Tablet, 500 MG PO DAILY


   Prescribed by: GAYLE CHO on 3/5/21 1410


Levothyroxine Sodium 25 Mcg Tablet, 25 MCG PO DAILY, (Reported)


Magnesium Hydroxide 400 Mg/5 Ml Oral.susp, 30 ML PO DAILY PRN for CONSTIPATION-

7TH LINE, (Reported)


Megestrol Acetate 400 Mg/10 Ml Oral.susp, 10 ML PO DAILY, (Reported)


Multivitamin 1 Each Tablet, 1 EACH PO DAILY, (Reported)


Phenobarbital 64.8 Mg Tablet, 97.2 MG PO HS, (Reported)


   TAKES 1 &  (64.8MG) TAB 


Polyethylene Glycol 3350 17 Gm Powd.pack, 17 GM PO DAILY, (Reported)


Trazodone HCl 100 Mg Tablet, 100 MG PO HS, (Reported)





Patient Home Medication List


Home Medication List Reviewed:  Yes





Review of Systems


Review of Systems


Constitutional:  No chills, No fever, No malaise


Respiratory:  no symptoms reported


Cardiovascular:  no symptoms reported


Musculoskeletal:  other (R leg pain)


Psychiatric/Neurological:  Denies Numbness, Denies Paresthesia; Weakness 

(generalized)





Past Medical-Social-Family Hx


Past Med/Social Hx:  Reviewed Nursing Past Med/Soc Hx


Patient Social History


2nd Hand Smoke Exposure:  No


Recent Hopitalizations:  No





Immunizations Up To Date


Date of Pneumonia Vaccine:  Feb 10, 2017


Date of Influenza Vaccine:  Oct 24, 2019





Seasonal Allergies


Seasonal Allergies:  No





Past Medical History


Surgeries:  Yes (left leg fracture with extensive repair)


Orthopedic


Respiratory:  No


Cardiac:  Yes


Hypertension


Neurological:  Yes


Dementia, Seizure Disorder


Genitourinary:  No


Gastrointestinal:  Yes


Gastroesophageal Reflux, Chronic Constipation


Musculoskeletal:  Yes (left leg fracture with muscle wasting, generalized 

weakness)


Endocrine:  No


HEENT:  No


Cancer:  No


Psychosocial:  Yes


Depression


Integumentary:  No


Blood Disorders:  Yes (hemolytic anemia)





Physical Exam


Vital Signs





Vital Signs - First Documented








 4/28/21





 21:38


 


Temp 36.1


 


Pulse 66


 


Resp 17


 


B/P (MAP) 161/103 (122)


 


O2 Delivery Room Air





Capillary Refill :


Height, Weight, BMI


Height: '"


Weight: lbs. oz. kg; 24.88 BMI


Method:


General Appearance:  No Apparent Distress, WD/WN


Neck:  Normal Inspection, Non Tender


Respiratory:  Chest Non Tender, Lungs Clear


Cardiovascular:  Regular Rate, Rhythm, No JVD


Extremity:  Normal Capillary Refill, Other (tender distal R leg w no gross 

deformity. NVI.  )


Neurologic/Psychiatric:  Alert, No Motor/Sensory Deficits, Normal Mood/Affect





Progress/Results/Core Measures


Suspected Sepsis


SIRS


Temperature: 


Pulse:  


Respiratory Rate: 


 


Blood Pressure  / 


Mean:





Results/Orders


Vital Signs/I&O











 4/28/21





 21:38


 


Temp 36.1


 


Pulse 66


 


Resp 17


 


B/P (MAP) 161/103 (122)


 


O2 Delivery Room Air





Capillary Refill :


Progress Note :  


Progress Note


Read x-ray report from today showing :  "Acute fx of medial and post distal 

tibia" 





Due to the fact family/ POA does not want anything done other than splinting/ 

palliative care I did not repeat x-rays, but based splinting on Hx, clinical 

exam and x-ray report from outside source.  All consistent w pt presentation and

patient is pleasant, coherent and cooperative with plan.  





Post splint applied to right leg and pt returned to [facility] via EMS





Departure


Impression





   Primary Impression:  


   Tibial fracture


   Qualified Codes:  S82.301A - Unspecified fracture of lower end of right 

   tibia, initial encounter for closed fracture


Disposition:  01 HOME, SELF-CARE (back to [facility])


Condition:  Improved





Departure-Patient Inst.


Decision time for Depature:  21:48


Referrals:  


DORIS OWENS MD (PCP/Family)


Primary Care Physician








ANTONETTE BENZ MD


Patient Instructions:  Lower Leg Fracture ED, Splint Care





Add. Discharge Instructions:  


Call Dr Benz's office to schedule an appointment for fracture management and 

to consider a cast for appropriate healing.











GAYLE CHO DO         Apr 28, 2021 21:49